# Patient Record
Sex: FEMALE | Race: BLACK OR AFRICAN AMERICAN | Employment: OTHER | URBAN - METROPOLITAN AREA
[De-identification: names, ages, dates, MRNs, and addresses within clinical notes are randomized per-mention and may not be internally consistent; named-entity substitution may affect disease eponyms.]

---

## 2018-07-26 ENCOUNTER — OFFICE VISIT (OUTPATIENT)
Dept: ORTHOPEDIC SURGERY | Age: 63
End: 2018-07-26

## 2018-07-26 VITALS
OXYGEN SATURATION: 96 % | SYSTOLIC BLOOD PRESSURE: 153 MMHG | RESPIRATION RATE: 16 BRPM | HEIGHT: 64 IN | TEMPERATURE: 98.5 F | DIASTOLIC BLOOD PRESSURE: 86 MMHG | BODY MASS INDEX: 47.8 KG/M2 | HEART RATE: 86 BPM | WEIGHT: 280 LBS

## 2018-07-26 DIAGNOSIS — M62.838 MUSCLE SPASM: ICD-10-CM

## 2018-07-26 DIAGNOSIS — M25.561 ACUTE PAIN OF RIGHT KNEE: ICD-10-CM

## 2018-07-26 DIAGNOSIS — M54.50 LUMBAR PAIN: Primary | ICD-10-CM

## 2018-07-26 DIAGNOSIS — R26.9 GAIT ABNORMALITY: ICD-10-CM

## 2018-07-26 DIAGNOSIS — M25.551 RIGHT HIP PAIN: ICD-10-CM

## 2018-07-26 DIAGNOSIS — M25.511 ACUTE PAIN OF RIGHT SHOULDER: ICD-10-CM
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
## 2018-07-26 DIAGNOSIS — E66.01 OBESITY, MORBID (HCC): ICD-10-CM

## 2018-07-26 RX ORDER — TIZANIDINE 4 MG/1
4 TABLET ORAL
Qty: 60 TAB | Refills: 1 | Status: SHIPPED | OUTPATIENT
Start: 2018-07-26 | End: 2019-04-15 | Stop reason: ALTCHOICE

## 2018-07-26 RX ORDER — DICLOFENAC SODIUM 75 MG/1
75 TABLET, DELAYED RELEASE ORAL 2 TIMES DAILY WITH MEALS
Qty: 60 TAB | Refills: 1 | Status: SHIPPED | OUTPATIENT
Start: 2018-07-26 | End: 2019-04-15 | Stop reason: ALTCHOICE

## 2018-07-26 RX ORDER — KETOROLAC TROMETHAMINE 15 MG/ML
60 INJECTION, SOLUTION INTRAMUSCULAR; INTRAVENOUS ONCE
Qty: 1 VIAL | Refills: 0
Start: 2018-07-26 | End: 2018-07-26

## 2018-07-26 NOTE — PATIENT INSTRUCTIONS

## 2018-07-26 NOTE — PROGRESS NOTES
Frank Nicholson [facility] 2. 
[address] Phone: [phone] Fax: [phone] NEW PATIENT Pt's YOB: 1955 ASSESSMENT Diagnoses and all orders for this visit: 1. Lumbar pain -     [25490] LS Spine 4V -     KETOROLAC TROMETHAMINE INJ 
-     ketorolac (TORADOL) 15 mg/mL soln injection; 4 mL by IntraMUSCular route once for 1 dose. -     THER/PROPH/DIAG INJECTION, SUBCUT/IM 
-     diclofenac EC (VOLTAREN) 75 mg EC tablet; Take 1 Tab by mouth two (2) times daily (with meals). -     REFERRAL TO ORTHOPEDICS 2. Fall, initial encounter 
-     diclofenac EC (VOLTAREN) 75 mg EC tablet; Take 1 Tab by mouth two (2) times daily (with meals). -     REFERRAL TO ORTHOPEDICS 3. Right hip pain 
-     diclofenac EC (VOLTAREN) 75 mg EC tablet; Take 1 Tab by mouth two (2) times daily (with meals). -     REFERRAL TO ORTHOPEDICS 4. Acute pain of right knee 
-     diclofenac EC (VOLTAREN) 75 mg EC tablet; Take 1 Tab by mouth two (2) times daily (with meals). -     REFERRAL TO ORTHOPEDICS 5. Acute pain of right shoulder 
-     REFERRAL TO ORTHOPEDICS 6. Gait abnormality 
-     diclofenac EC (VOLTAREN) 75 mg EC tablet; Take 1 Tab by mouth two (2) times daily (with meals). -     REFERRAL TO ORTHOPEDICS 7. Muscle spasm -     tiZANidine (ZANAFLEX) 4 mg tablet; Take 1 Tab by mouth three (3) times daily as needed. Indications: Muscle Spasm 8. [facility], [facility] IMPRESSION AND PLAN: 
Nawaf Marquez is a 57 y.o.  female with history of a severe fall and subsequent injury to her hip, shoulder and back. Her foot became stuck in a 1 foot deep hole (a post hole per patient) on 7/18/18 while at the [facility]. She has experienced right hip, back  and right shoulder pain since the incident and has tried ibuprofen with minimal relief. 1) Pt was given information on lumbar exercises.   
2)  Toradol injection in the office today. 3) Pt was given prescription for Zanaflex 4 mg 1 tab TID prn muscle spasm. 4) She was also prescribed Voltaren 75 mg 1 tab BID prn pain with food. 5) Pt was offered referral to physical therapy but deferred at this time. 6) Referral to Dr. Charu Carlson for right hip, knee, and shoulder evaluation. 7) Ms. Kaia Woodruff has a reminder for a \"due or due soon\" health maintenance. I have asked that she contact her primary care provider, Corey German MD, for follow-up on this health maintenance. 8)  demonstrated consistency with prescribing. 9) Pt will follow-up in 6 weeks or sooner if needed. HISTORY OF PRESENT ILLNESS: 
Jose Roberto Garland is a 57 y.o.  female with history of right hip pain. She presents to the office today as a new patient. Pt reports that she fell in a 1 foot deep hole while leaving the [facility] with her daughter on 7/18/18. She states that her foot got stuck in a hole in the grass and she fell. Pt states she felt a popping in her R hip when she fell. She could not get up with the help of others so the ambulance was called. She was taken to [facility] ER where they did x-rays of her hip and the pt was told there was no abnormal findings. Pt states that when she rises from sitting, she experiences a burning pain in her right thigh and groin. She denies experiencing any left sided symptoms at this time. She states that she does not have trouble walking but intermittently pain will suddenly shoot down her right leg and causes her to sit down. The pain starts in her right buttock and radiates down her right leg. Pt only gets relief from lying down and her pain is worse when sitting, standing, walking lifting, and bending. She also has pain in her right shoulder and states she landed with her arms stretched out forward Pt was given prescription for ibuprofen 600 mg without relief. Pt at this time desires to proceed with medication evaluation.  
 
Of note, her  is from [place]. Pain Scale: 8/10 PCP: Roseanne Hoang MD 
 
Past Medical History:  
Diagnosis Date  Hyperlipidemia  Hypertension Social History Social History  Marital status: UNKNOWN Spouse name: N/A  
 Number of children: N/A  
 Years of education: N/A Occupational History  Not on file. Social History Main Topics  Smoking status: Never Smoker  Smokeless tobacco: Never Used  Alcohol use No  
 Drug use: No  
 Sexual activity: Not on file Other Topics Concern  Not on file Social History Narrative Current Outpatient Prescriptions Medication Sig Dispense Refill  diclofenac EC (VOLTAREN) 75 mg EC tablet Take 1 Tab by mouth two (2) times daily (with meals). 60 Tab 1  
 tiZANidine (ZANAFLEX) 4 mg tablet Take 1 Tab by mouth three (3) times daily as needed. Indications: Muscle Spasm 60 Tab 1  
 hydroCHLOROthiazide (HYDRODIURIL) 25 mg tablet Take 25 mg by mouth daily.  atorvastatin (LIPITOR) 40 mg tablet Take 40 mg by mouth daily.  ibuprofen (MOTRIN) 600 mg tablet Take 1 Tab by mouth every six (6) hours as needed for Pain. 20 Tab 0 No Known Allergies REVIEW OF SYSTEMS Constitutional: Negative for fever, chills, or weight change. Respiratory: Negative for cough or shortness of breath. Cardiovascular: Negative for chest pain or palpitations. Gastrointestinal: Negative for acid reflux, change in bowel habits, or constipation. Genitourinary: Negative for dysuria and flank pain. Musculoskeletal: Positive for right hip, shoulder and lumbar pain. Skin: Negative for rash. Neurological: Negative for headaches, dizziness, or numbness. Endo/Heme/Allergies: Negative for increased bruising. Psychiatric/Behavioral: Negative for difficulty with sleep. PHYSICAL EXAMINATION Visit Vitals  /86 (BP 1 Location: Left arm, BP Patient Position: Sitting)  Pulse 86  Temp 98.5 °F (36.9 °C) (Oral)  Resp 16  Ht 5' 4\" (1.626 m)  Wt 280 lb (127 kg)  SpO2 96%  BMI 48.06 kg/m2 Constitutional: Awake, alert, with mild-moderate discomfort. She is sitting on her left hip with her right leg stretched out. HEENT: Normocephalic. Atraumatic. Oropharynx is moist and clear. PERRL. EOMI. Sclerae are nonicteric Heart: Regular rate and rhythm Lungs: Clear to auscultation bilaterally Abdomen: Soft and nontender. Bowel sounds are present Neurological: 1+ symmetrical DTRs in the upper extremities. 1+ symmetrical DTRs in the lower extremities. Sensation to light touch is intact. Negative Mike's sign bilaterally. Skin: warm, dry, and intact. Musculoskeletal: Very tight across the upper trapezius bilaterally. Positive Alicia' sign and Neer's sign on the right. Pain with extension, axial loading, or forward flexion. Severe pain with internal or external rotation of her right hip. Positive straight leg raise on the right. Biceps  Triceps Deltoids Wrist Ext Wrist Flex Hand Intrin Right +4/5 +4/5 +4/5 +4/5 +4/5 +4/5 Left +4/5 +4/5 +4/5 +4/5 +4/5 +4/5 Hip Flex  Quads Hamstrings Ankle DF EHL Ankle PF Right +3/5 -4/5 -4/5  4/5  4/5  4/5 Left +4/5 +4/5 +4/5 +4/5 +4/5 +4/5 IMAGING: 
 
Lumbar X-ray on 07/26/18 was personally reviewed with the patient and demonstrated: 1. Very mild dextroscoliosis 2. Multilevel degenerative discs and facets 3. No instability 4. Large body habitus Right Knee X-ray on 07/18/18 was personally reviewed with the patient and demonstrated: 
 
Results from [place] encounter on 07/18/18 XR KNEE RT MIN 4 V Narrative Right knee radiograph 4 views INDICATION: Pain after fall COMPARISON: None Impression IMPRESSION: 
Tricompartmental joint space narrowing is seen most notable in the lateral and 
patellofemoral compartments. Osteochondromatosis noted. Age indeterminate 
fracture / avulsion of the superior patellar enthesophyte.  Additional areas of 
enthesopathy versus exostosis noted involving the proximal fibula and tibia. Right Hip X-ray on 07/18/18 was personally reviewed with the patient and demonstrated:  
 
Study Result Right hip radiograph 2 views including frontal pelvis 
  INDICATION: Pain after fall 
  
COMPARISON: None 
  
IMPRESSION IMPRESSION: 
There is multifocal enthesopathy. No acute fracture or dislocation seen. 
   
 
 
Written by Yanni Ventura, as dictated by Zeyad Dill MD. 
I, Dr. Zeyad Dill confirm that all documentation is accurate.

## 2018-07-26 NOTE — MR AVS SNAPSHOT
[address] [facility] 
 
 
 [address] [facility] 
[phone] Patient: Alicia Spicer MRN: [id number] :1955 Visit Information Date & Time Provider Department Dept. Phone Encounter #  
 2018  3:00 PM Felicia Haque MD [facility] [phone] [id number] Follow-up Instructions Return in about 6 weeks (around 2018) for with me and pt to see Dr. Farideh agosto. Upcoming Health Maintenance Date Due Hepatitis C Screening 1955 DTaP/Tdap/Td series (1 - Tdap) 10/21/1974 PAP AKA CERVICAL CYTOLOGY 10/21/1974 FOBT Q 1 YEAR AGE 50-75 10/21/2003 BREAST CANCER SCRN MAMMOGRAM 2012 ZOSTER VACCINE AGE 60> 2013 Bone Densitometry (Dexa) Screening 10/21/2018 Influenza Age 5 to Adult 2018 Allergies as of 2018  Review Complete On: 2018 By: Zheng Key LPN No Known Allergies Current Immunizations  Never Reviewed No immunizations on file. Not reviewed this visit You Were Diagnosed With   
  
 Codes Comments Lumbar pain    -  Primary ICD-10-CM: M54.5 ICD-9-CM: 724.2 Fall, initial encounter     ICD-10-CM: W19. Clifforddenisse Jasso ICD-9-CM: E888.9 Right hip pain     ICD-10-CM: M25.551 ICD-9-CM: 719.45 Acute pain of right knee     ICD-10-CM: M25.561 ICD-9-CM: 719.46 Gait abnormality     ICD-10-CM: R26.9 ICD-9-CM: 781.2 Muscle spasm     ICD-10-CM: X66.864 ICD-9-CM: 728.85 Vitals BP Pulse Temp Resp Height(growth percentile) Weight(growth percentile) 153/86 (BP 1 Location: Left arm, BP Patient Position: Sitting) 86 98.5 °F (36.9 °C) (Oral) 16 5' 4\" (1.626 m) 280 lb (127 kg) SpO2 BMI OB Status Smoking Status 96% 48.06 kg/m2 Postmenopausal Never Smoker BMI and BSA Data Body Mass Index Body Surface Area 48.06 kg/m 2 2.39 m 2 Your Updated Medication List  
  
   
 This list is accurate as of 7/26/18  4:52 PM.  Always use your most recent med list.  
  
  
  
  
 diclofenac EC 75 mg EC tablet Commonly known as:  VOLTAREN Take 1 Tab by mouth two (2) times daily (with meals). hydroCHLOROthiazide 25 mg tablet Commonly known as:  HYDRODIURIL Take 25 mg by mouth daily. ibuprofen 600 mg tablet Commonly known as:  MOTRIN Take 1 Tab by mouth every six (6) hours as needed for Pain.  
  
 ketorolac 15 mg/mL Soln injection Commonly known as:  TORADOL  
4 mL by IntraMUSCular route once for 1 dose. LIPITOR 40 mg tablet Generic drug:  atorvastatin Take 40 mg by mouth daily. tiZANidine 4 mg tablet Commonly known as:  Joe Patel Take 1 Tab by mouth three (3) times daily as needed. Indications: Muscle Spasm Prescriptions Printed Refills  
 diclofenac EC (VOLTAREN) 75 mg EC tablet 1 Sig: Take 1 Tab by mouth two (2) times daily (with meals). Class: Print Route: Oral  
 tiZANidine (ZANAFLEX) 4 mg tablet 1 Sig: Take 1 Tab by mouth three (3) times daily as needed. Indications: Muscle Spasm Class: Print Route: Oral  
  
We Performed the Following AMB POC XRAY, SPINE, LUMBOSACRAL; 4+ [id number] CPT(R)] KETOROLAC TROMETHAMINE INJ [ [facility]] MT THER/PROPH/DIAG INJECTION, SUBCUT/IM [id number] CPT(R)] REFERRAL TO ORTHOPEDICS [FIX568 Custom] Comments:  
 Evaluation of right hip, right shoulder and right knee. Follow-up Instructions Return in about 6 weeks (around 9/6/2018) for with me and pt to see Dr. Christopher agosto. Referral Information Referral ID Referred By Referred To  
  
 [id number] Lexi Gomez MD   
   [facility] [address] [place], [address] Phone: [phone] Fax: [phone] Visits Status Start Date End Date 1 New Request 7/26/18 7/26/19  If your referral has a status of pending review or denied, additional information will be sent to support the outcome of this decision. Patient Instructions Low Back Pain: Exercises Your Care Instructions Here are some examples of typical rehabilitation exercises for your condition. Start each exercise slowly. Ease off the exercise if you start to have pain. Your doctor or physical therapist will tell you when you can start these exercises and which ones will work best for you. How to do the exercises Press-up 1. Lie on your stomach, supporting your body with your forearms. 2. Press your elbows down into the floor to raise your upper back. As you do this, relax your stomach muscles and allow your back to arch without using your back muscles. As your press up, do not let your hips or pelvis come off the floor. 3. Hold for 15 to 30 seconds, then relax. 4. Repeat 2 to 4 times. Alternate arm and leg (bird dog) exercise Do this exercise slowly. Try to keep your body straight at all times, and do not let one hip drop lower than the other. 1. Start on the floor, on your hands and knees. 2. Tighten your belly muscles. 3. Raise one leg off the floor, and hold it straight out behind you. Be careful not to let your hip drop down, because that will twist your trunk. 4. Hold for about 6 seconds, then lower your leg and switch to the other leg. 5. Repeat 8 to 12 times on each leg. 6. Over time, work up to holding for 10 to 30 seconds each time. 7. If you feel stable and secure with your leg raised, try raising the opposite arm straight out in front of you at the same time. Knee-to-chest exercise 1. Lie on your back with your knees bent and your feet flat on the floor. 2. Bring one knee to your chest, keeping the other foot flat on the floor (or keeping the other leg straight, whichever feels better on your lower back). 3. Keep your lower back pressed to the floor. Hold for at least 15 to 30 seconds. 4. Relax, and lower the knee to the starting position. 5. Repeat with the other leg. Repeat 2 to 4 times with each leg. 6. To get more stretch, put your other leg flat on the floor while pulling your knee to your chest. 
Curl-ups 1. Lie on the floor on your back with your knees bent at a 90-degree angle. Your feet should be flat on the floor, about 12 inches from your buttocks. 2. Cross your arms over your chest. If this bothers your neck, try putting your hands behind your neck (not your head), with your elbows spread apart. 3. Slowly tighten your belly muscles and raise your shoulder blades off the floor. 4. Keep your head in line with your body, and do not press your chin to your chest. 
5. Hold this position for 1 or 2 seconds, then slowly lower yourself back down to the floor. 6. Repeat 8 to 12 times. Pelvic tilt exercise 1. Lie on your back with your knees bent. 2. \"Brace\" your stomach. This means to tighten your muscles by pulling in and imagining your belly button moving toward your spine. You should feel like your back is pressing to the floor and your hips and pelvis are rocking back. 3. Hold for about 6 seconds while you breathe smoothly. 4. Repeat 8 to 12 times. Heel dig bridging 1. Lie on your back with both knees bent and your ankles bent so that only your heels are digging into the floor. Your knees should be bent about 90 degrees. 2. Then push your heels into the floor, squeeze your buttocks, and lift your hips off the floor until your shoulders, hips, and knees are all in a straight line. 3. Hold for about 6 seconds as you continue to breathe normally, and then slowly lower your hips back down to the floor and rest for up to 10 seconds. 4. Do 8 to 12 repetitions. Hamstring stretch in doorway 1. Lie on your back in a doorway, with one leg through the open door. 2. Slide your leg up the wall to straighten your knee. You should feel a gentle stretch down the back of your leg. 3. Hold the stretch for at least 15 to 30 seconds. Do not arch your back, point your toes, or bend either knee. Keep one heel touching the floor and the other heel touching the wall. 4. Repeat with your other leg. 5. Do 2 to 4 times for each leg. Hip flexor stretch 1. Kneel on the floor with one knee bent and one leg behind you. Place your forward knee over your foot. Keep your other knee touching the floor. 2. Slowly push your hips forward until you feel a stretch in the upper thigh of your rear leg. 3. Hold the stretch for at least 15 to 30 seconds. Repeat with your other leg. 4. Do 2 to 4 times on each side. Wall sit 1. Stand with your back 10 to 12 inches away from a wall. 2. Lean into the wall until your back is flat against it. 3. Slowly slide down until your knees are slightly bent, pressing your lower back into the wall. 4. Hold for about 6 seconds, then slide back up the wall. 5. Repeat 8 to 12 times. Follow-up care is a key part of your treatment and safety. Be sure to make and go to all appointments, and call your doctor if you are having problems. It's also a good idea to know your test results and keep a list of the medicines you take. Where can you learn more? Go to http://yas-alverto.info/. Enter Y209 in the search box to learn more about \"Low Back Pain: Exercises. \" Current as of: November 29, 2017 Content Version: 11.7 © 1060-3069 [employer]. Care instructions adapted under license by [employer] (which disclaims liability or warranty for this information). If you have questions about a medical condition or this instruction, always ask your healthcare professional. [address] any warranty or liability for your use of this information. Introducing [facility]!    
 Moustapha Gutierrez introduces [employer] patient portal. Now you can access parts of your medical record, email your doctor's office, and request medication refills online. 1. In your internet browser, go to [email or web address] 2. Click on the First Time User? Click Here link in the Sign In box. You will see the New Member Sign Up page. 3. Enter your [employer] Access Code exactly as it appears below. You will not need to use this code after youve completed the sign-up process. If you do not sign up before the expiration date, you must request a new code. · [employer] Access Code: [email or web address] Expires: 10/16/2018 12:21 PM 
 
4. Enter the last four digits of your Social Security Number (xxxx) and Date of Birth (mm/dd/yyyy) as indicated and click Submit. You will be taken to the next sign-up page. 5. Create a [employer] ID. This will be your [employer] login ID and cannot be changed, so think of one that is secure and easy to remember. 6. Create a [employer] password. You can change your password at any time. 7. Enter your Password Reset Question and Answer. This can be used at a later time if you forget your password. 8. Enter your e-mail address. You will receive e-mail notification when new information is available in [address] 9. Click Sign Up. You can now view and download portions of your medical record. 10. Click the Download Summary menu link to download a portable copy of your medical information. If you have questions, please visit the Frequently Asked Questions section of the [employer] website. Remember, [employer] is NOT to be used for urgent needs. For medical emergencies, dial 911. Now available from your iPhone and Android! Please provide this summary of care documentation to your next provider. Your primary care clinician is listed as Del Angel [place]. If you have any questions after today's visit, please call [phone].

## 2018-08-07 ENCOUNTER — OFFICE VISIT (OUTPATIENT)
Dept: ORTHOPEDIC SURGERY | Age: 63
End: 2018-08-07

## 2018-08-07 VITALS
WEIGHT: 278 LBS | OXYGEN SATURATION: 97 % | BODY MASS INDEX: 47.46 KG/M2 | DIASTOLIC BLOOD PRESSURE: 85 MMHG | HEIGHT: 64 IN | SYSTOLIC BLOOD PRESSURE: 133 MMHG | HEART RATE: 101 BPM

## 2018-08-07 DIAGNOSIS — S76.011A HIP STRAIN, RIGHT, INITIAL ENCOUNTER: ICD-10-CM

## 2018-08-07 DIAGNOSIS — M51.16 LUMBAR DISC DISEASE WITH RADICULOPATHY: ICD-10-CM

## 2018-08-07 DIAGNOSIS — M25.551 PAIN OF RIGHT HIP JOINT: Primary | ICD-10-CM

## 2018-08-07 NOTE — PROGRESS NOTES
HISTORY OF PRESENT ILLNESS:  Pilo Moreno is a 77-year-old female who is referred by Dr. Nicci Negro for consultation regarding right hip pain.   On further questioning, she has pain along the posterolateral aspect of the right hip and buttock that radiates down the right leg. She notes numbness in the lateral aspect of the right thigh. The pain occasionally radiates below her right knee. This occurred after an injury several weeks ago. She really did not have a lot of pain before that. She went to the emergency room, and they placed her on some Ibuprofen. She then saw Dr. Nicci Negro, who gave her some medication that has helped her a little bit more with her discomfort. Her pain is present when she is sitting and walking, as well as when she is trying to sleep. She does notice increased pain with weightbearing. She notes occasional pain in the groin. She does not notice leg length discrepancy. She does not utilize and ambulatory assist.     She does have a history of about a 100-pound weight gain over the past nine months. She states she was diagnosed with a thyroid problem. PHYSICAL EXAMINATION:   Clinical examination today reveals a significantly overweight, 77-year-old,  female in mild discomfort. She moves relatively easily on and off the examination table. She does not ambulate with an antalgic gait. She does not utilize an ambulatory assist.  With reference to her left hip, she is able to straight leg raise to 90° today without significant discomfort. This does result in slight pain in the right posterior buttock. She has good passive rotation of the left hip without pain. Left hip roll test is negative. Vicente's test on the left side is negative. With reference to her right hip, she is able to straight leg raise about 60° with help, but this does result in some radicular pain in the right lower extremity.   She has good passive rotation of the right hip without distraction. Vicente's test on the right side results in pain in the posterolateral aspect of the right hip and right thigh. Leg lengths are symmetrical in the supine position. Right hip roll test is negative. Neurovascular testing is intact to the lower extremities proximal and distal to motor strength and sensation. RADIOGRAPHS:  X-rays of her pelvis today reveal her hip joints appear symmetrical and essentially within normal limits. She still has very good joint space in the weightbearing portion of both hips. She has enthesopathy of both iliac crests, and this is old and chronic. Her pelvic x-ray does reveal significant degenerative disc disease at the L4-5 and L5-S1 level. These are not complete back x-rays. Dr. Evelina Mobley has done x-rays of her lumbar spine before. IMPRESSION:      1. 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 2. Right hip strain. RECOMMENDATIONS:  I discussed with the patient I do not feel her symptoms are related to her right hip. Her hip examination and radiographs are essentially negative. I think her symptoms are neurogenic in origin. She will continue with Dr. Evelina Mobley. She just started the new medication about a week and a half ago. So, she needs to give this a little bit of time to work. She will followup with Dr. Evelina Mobley in about three to four weeks. I did discuss with the patient weight loss. She is contemplating proceeding with gastric bypass surgery next spring. In addition, she is working on getting her thyroid problem evaluated. The patient understands, and all of her question were answered. She will followup with Dr. Evelina Mobley.                     Vitals:    08/07/18 1333   BP: 133/85   Pulse: (!) 101   SpO2: 97%   Weight: 278 lb (126.1 kg)   Height: 5' 4\" (1.626 m)       Patient Active Problem List   Diagnosis Code    Obesity, morbid ([facility] [address] E66.01     Patient Active Problem List    Diagnosis Date Noted    Obesity, morbid ([facility] [address] 07/26/2018     Current Outpatient Prescriptions   Medication Sig Dispense Refill    diclofenac EC (VOLTAREN) 75 mg EC tablet Take 1 Tab by mouth two (2) times daily (with meals). 60 Tab 1    tiZANidine (ZANAFLEX) 4 mg tablet Take 1 Tab by mouth three (3) times daily as needed. Indications: Muscle Spasm 60 Tab 1    hydroCHLOROthiazide (HYDRODIURIL) 25 mg tablet Take 25 mg by mouth daily.  atorvastatin (LIPITOR) 40 mg tablet Take 40 mg by mouth daily.  ibuprofen (MOTRIN) 600 mg tablet Take 1 Tab by mouth every six (6) hours as needed for Pain.  20 Tab 0     No Known Allergies  Past Medical History:   Diagnosis Date    Hyperlipidemia     Hypertension      Past Surgical History:   Procedure Laterality Date    HX HYSTERECTOMY       Family History   Problem Relation Age of Onset    Family history unknown: Yes     Social History   Substance Use Topics    Smoking status: Never Smoker    Smokeless tobacco: Never Used    Alcohol use No

## 2018-08-27 ENCOUNTER — TELEPHONE (OUTPATIENT)
Dept: ORTHOPEDIC SURGERY | Age: 63
End: 2018-08-27

## 2018-08-27 NOTE — TELEPHONE ENCOUNTER
Returned call to patient, verified , per patient, she is trying to set up transit with a Medicaid transport company. Per patient, they are requesting permission for patient to use their services. Per patient they need to verify patient cannot walk for long distances/patient is unable to climb steps due to her lower back pain. Informed patient I would return call once letter has been approved/denied. Patient verbalized agreement/understanding.

## 2018-08-27 NOTE — TELEPHONE ENCOUNTER
Letter provided, called patient, verified , reviewed contents of letter with patient. Patient verbalized agreement/understanding. No further action required at this time.

## 2018-08-27 NOTE — TELEPHONE ENCOUNTER
Patient called and said that she needs a letter from [address] [place] [address] that would state that she is having issues walking. Patient said she needs the letter for transportation, walking and climbing. Will  from [facility]. Patient tel. [phone].

## 2018-08-27 NOTE — LETTER
8/27/2018 3:44 PM 
 
Ms. Carlos Manuel Nielsen [address] To Whom It May Concern: 
 
Juan Carlos Sawyer is currently under the care of [facility]. Ms. Theo Barber has difficulty with walking, sitting, and climbing due to her back pain. If there are questions or concerns please have the patient contact our office. Sincerely, STANLEY Miller

## 2018-08-27 NOTE — TELEPHONE ENCOUNTER
Call pt and clarify what she needs. I don't understand why she needs this \"Patient said she needs the letter for transportation, walking and climbing. \"   The record does not document her needing an assistive device for walking.

## 2019-04-15 ENCOUNTER — OFFICE VISIT (OUTPATIENT)
Dept: SURGERY | Age: 64
End: 2019-04-15

## 2019-04-15 VITALS
HEART RATE: 87 BPM | TEMPERATURE: 98.2 F | WEIGHT: 280.7 LBS | BODY MASS INDEX: 47.92 KG/M2 | DIASTOLIC BLOOD PRESSURE: 77 MMHG | OXYGEN SATURATION: 97 % | SYSTOLIC BLOOD PRESSURE: 147 MMHG | HEIGHT: 64 IN

## 2019-04-15 DIAGNOSIS — I10 HYPERTENSION, UNSPECIFIED TYPE: ICD-10-CM

## 2019-04-15 DIAGNOSIS — E66.01 MORBID OBESITY WITH BMI OF 45.0-49.9, ADULT (HCC): ICD-10-CM

## 2019-04-15 DIAGNOSIS — E11.9 TYPE 2 DIABETES MELLITUS WITHOUT COMPLICATION, WITHOUT LONG-TERM CURRENT USE OF INSULIN (HCC): ICD-10-CM

## 2019-04-15 DIAGNOSIS — E78.5 HYPERLIPIDEMIA, UNSPECIFIED HYPERLIPIDEMIA TYPE: ICD-10-CM

## 2019-04-15 DIAGNOSIS — J45.909 ASTHMA, UNSPECIFIED ASTHMA SEVERITY, UNSPECIFIED WHETHER COMPLICATED, UNSPECIFIED WHETHER PERSISTENT: ICD-10-CM

## 2019-04-15 DIAGNOSIS — E03.9 HYPOTHYROIDISM, UNSPECIFIED TYPE: ICD-10-CM

## 2019-04-15 DIAGNOSIS — E66.01 OBESITY, MORBID (HCC): Primary | ICD-10-CM

## 2019-04-15 DIAGNOSIS — K21.9 GASTROESOPHAGEAL REFLUX DISEASE WITHOUT ESOPHAGITIS: ICD-10-CM

## 2019-04-15 RX ORDER — PANTOPRAZOLE SODIUM 40 MG/1
TABLET, DELAYED RELEASE ORAL
Refills: 0 | COMMUNITY
Start: 2019-01-18 | End: 2019-10-21

## 2019-04-15 RX ORDER — LEVOTHYROXINE SODIUM 75 UG/1
TABLET ORAL
COMMUNITY
End: 2019-11-14

## 2019-04-15 RX ORDER — METFORMIN HYDROCHLORIDE 1000 MG/1
1000 TABLET ORAL 2 TIMES DAILY WITH MEALS
COMMUNITY
End: 2019-10-21 | Stop reason: ALTCHOICE

## 2019-04-15 RX ORDER — ASPIRIN 81 MG/1
81 TABLET ORAL
COMMUNITY
End: 2019-10-30

## 2019-04-15 RX ORDER — CETIRIZINE HCL 10 MG
10 TABLET ORAL
Refills: 6 | COMMUNITY
Start: 2019-03-27

## 2019-04-15 RX ORDER — ALBUTEROL SULFATE 90 UG/1
AEROSOL, METERED RESPIRATORY (INHALATION)
Refills: 1 | COMMUNITY
Start: 2019-02-24

## 2019-04-15 NOTE — PROGRESS NOTES
Bariatric Surgery Consultation Subjective: The patient is a 70 y.o. obese female with a Body mass index is 48.18 kg/m². .  The patient is at her heaviest weight for the past several years. she has been overweight since being diagnosed with pituitary tumor 5 years ago. she has been considering surgery since past 2 years. she desires surgery at this time because of multiple health concerns and their lifestyle issues which are hindered by their weight. she has been referred by his family physician Dr Nyla Goetz for evaluation and treatment of their obesity via surgical intervention. Pacheco Hernadez has tried multiple diets in her lifetime most recently tried behavior modification and unsupervised diets Bariatric comorbidities present are Patient Active Problem List  
Diagnosis Code  Obesity, morbid ([facility] [address] E66.01  
 Hypertension I10  
 Hyperlipidemia E78.5  Asthma J45.909  Diabetes ([facility] [address] E11.9  GERD (gastroesophageal reflux disease) K21.9  Morbid obesity with BMI of 45.0-49.9, adult (HCC) E66.01, Z68.42  
 Hypothyroid E03.9  Low back pain M54.5  Arthritis of knee M17.10 The patient is considering laparoscopic gastric bypass surgery for surgical weight loss due to their ineffective progress with medical forms of weight loss and the urging of their physician who cares for their primary medical issues. The patient  now presents  for consideration for weight loss surgery understanding the benefits of this over a medical approach of weight loss as was discussed in our presentation on weight loss surgery. They have discussed their plans both with their family and primary care physician who is in support of their pursuit of such. The patient has not had health issues as of late and denies and gastrointestinal disturbances other than what is outlined below in their review of symptoms.  All of their prior evaluations available by both their PCP's and specialists physicians have been reviewed today either in the Care Everywhere portal or scanned under the media tab. I have spent a large portion of my initial consultation today reviewing the patients current dietary habits which have contributed to their health issues and obesity. I have suggested to them personally a dietary regimen that they can initiate now to help with their status as it pertains to their weight. They understand that the most important aspect of their journey through their weight loss endeavor will be their adherence to a new lifestyle of healthy eating behavior. They also understand that an adherence to an exercise program will not only help with weight loss but is ultimately important in weight maintenance. The patients goal weight is 165 lb. These goals are consistent with expected outcomes of their desired operation. her Medical goals are resolution of these health issues. Patient Active Problem List  
 Diagnosis Date Noted  Morbid obesity with BMI of 45.0-49.9, adult ([facility] [address] 04/15/2019  Hypertension  Hyperlipidemia  Asthma  Diabetes ([facility] [address]  GERD (gastroesophageal reflux disease)  Hypothyroid  Low back pain  Arthritis of knee  Obesity, morbid ([facility] [address] 07/26/2018 Past Surgical History:  
Procedure Laterality Date  ABDOMEN SURGERY PROC UNLISTED    
 abdominoplasty  HX HYSTERECTOMY  HX OTHER SURGICAL  01/28/2019 Pituitary tumor resection, Dr. Husain Oar  HX TONSILLECTOMY Social History Tobacco Use  Smoking status: Never Smoker  Smokeless tobacco: Never Used Substance Use Topics  Alcohol use: No  
  
Family History Problem Relation Age of Onset  Pancreatic Cancer Mother  Diabetes Father  Thyroid Disease Maternal Grandmother  Other Maternal Grandmother   
     pituitary tumor Current Outpatient Medications Medication Sig Dispense Refill  ibuprofen (ADVIL) 100 mg tablet Take 100 mg by mouth every six (6) hours as needed for Pain.  levothyroxine (SYNTHROID) 75 mcg tablet Take  by mouth Daily (before breakfast).  metFORMIN (GLUCOPHAGE) 1,000 mg tablet Take 1,000 mg by mouth two (2) times daily (with meals).  hydroCHLOROthiazide (HYDRODIURIL) 25 mg tablet Take 25 mg by mouth daily.  atorvastatin (LIPITOR) 40 mg tablet Take 40 mg by mouth daily.  VENTOLIN HFA 90 mcg/actuation inhaler INHALE 2 PUFFS BY MOUTH EVERY 6 HOURS  1  
 aspirin delayed-release 81 mg tablet Take 81 mg by mouth.  cetirizine (ZYRTEC) 10 mg tablet TAKE 1 BY MOUTH IN THE MORNING  6  
 pantoprazole (PROTONIX) 40 mg tablet TAKE 1 TABLET BY MOUTH DAILY  0 No Known Allergies Review of Systems:  
  
 
 
 
General - No history or complaints of unexpected fever, chills, or weight loss Head/Neck - No history or complaints of headache, diplopia, dysphagia, hearing loss Cardiac - No history or complaints of chest pain, palpitations, murmur, or shortness of breath Pulmonary - No history or complaints of shortness of breath, productive cough, hemoptysis Gastrointestinal - has reflux noted which is controlled with PPI,no  abdominal pain, obstipation/constipation or blood per rectum Genitourinary - No history or complaints of hematuria/dysuria, stress urinary incontinence symptoms, or renal lithiasis Musculoskeletal - has joint pain in their knees and low back,  no muscular weakness Hematologic - No history or complaints of bleeding disorders,  No blood transfusions Neurologic - No history or complaints of  migraine headaches, seizure activity, syncopal episodes, TIA or stroke Integumentary - No history or complaints of rashes, abnormal nevi, skin cancer Gynecological - No abnormal bleeding, has recurrent UTIs from diabetic medications Objective:  
 
Visit Vitals /77 (BP 1 Location: Left arm, BP Patient Position: Sitting) Pulse 87 Temp 98.2 °F (36.8 °C) (Temporal) Ht 5' 4\" (1.626 m) Wt 127.3 kg (280 lb 11.2 oz) SpO2 97% BMI 48.18 kg/m² Physical Examination: General appearance - alert, well appearing, and in no distress and oriented to person, place, and time Mental status - alert, oriented to person, place, and time, normal mood, behavior, speech, dress, motor activity, and thought processes Eyes - pupils equal and reactive, extraocular eye movements intact, sclera anicteric, left eye normal, right eye normal 
Ears - right ear normal, left ear normal 
Nose - normal and patent, no erythema, discharge or polyps Mouth - mucous membranes moist, pharynx normal without lesions Neck - supple, no significant adenopathy Lymphatics - no palpable lymphadenopathy, no hepatosplenomegaly Chest - clear to auscultation, no wheezes, rales or rhonchi, symmetric air entry Heart - normal rate, regular rhythm, normal S1, S2, no murmurs, rubs, clicks or gallops Abdomen - soft, nontender, nondistended, no masses or organomegaly Back exam - full range of motion, no tenderness, palpable spasm or pain on motion Neurological - alert, oriented, normal speech, no focal findings or movement disorder noted Musculoskeletal - no joint tenderness, deformity or swelling Extremities - peripheral pulses normal, no pedal edema, no clubbing or cyanosis Skin - normal coloration and turgor, no rashes, no suspicious skin lesions noted Labs:  
 
No results found for: WBC, WBCLT, HGBPOC, HGB, HGBP, HCTPOC, HCT, PHCT, RBCH, PLT, MCV, HGBEXT, HCTEXT, PLTEXT, HGBEXT, HCTEXT, PLTEXT No results found for: NA, K, CL, CO2, AGAP, GLU, BUN, CREA, BUCR, GFRAA, GFRNA, CA, TBIL, TBILI, GPT, SGOT, AP, TP, ALB, GLOB, AGRAT, ALT No results found for: IRON, FE, TIBC, IBCT, PSAT, FERR No results found for: FOL, RBCF No results found for: Angy Mckeon, Maggie Brady, Mahsa Wyatt, [id number] Assessment: Morbid obesity with associated comorbidity Plan: laparoscopic gastric bypass surgery This is a 70 y.o. female with a BMI of Body mass index is 48.18 kg/m². and the weight-related co-morbidties of hypertension, diabetes and arthritis. Carlos Manuel meets the NIH criteria for bariatric surgery based upon the BMI of Body mass index is 48.18 kg/m². and multiple weight-related co-morbidties. Carlos Manuel has elected laparoscopic gastric bypass as her intervention of choice for treatment of morbid obestiy through surgical means secondary to its uniform results,  profound baseline suppression of hunger and pace at which weight is lost. 
 
In the office today, following Carlos Manuel's history and physical examination, a 30 minute discussion regarding the anatomic alterations for the laparoscopic gastric bypass  was undertaken. The dietary expectations and the patient  dependent factors for success were thoroughly discussed, to include the need for interval follow-up and long-term dietary changes associated with success. The possible short and long term  complications of the gastric bypass were also discussed, to include but not limited to;death, DVT/PE, staple line leak, bleeding, stricture formation, infection,internal hernia  and pouch dilation. Specific weight related outcomes for success were also discussed with an emphasis on careful and close follow-up with the first year and dietary behavior modification over the first years as baseline cyclical hunger returns  The patient expressed an understanding of the above factors, and her questions were answered in their entirety. In addition, the patient attended a 1.5 hour power point seminar regarding obesity, surgical weight loss including, adjustable gastric band, gastric bypass, and sleeve gastrectomy. This discussion contrasted the different surgical techniques, mechanisms of actions and expected outcomes, and surgical and medical risks associated with each procedure.   During this seminar, there was a long question and answer session where each questions was answered until there were no additional questions. Today, the patient had all of her questions answered and desires to proceed with  bariatric surgery initially choosing the gastric bypass as her surgical option. Secondary Diagnoses:  
 
Dietary Intervention  - The patient is currently scheduled to see or has been followed by a bariatric nutritionist for an attempt at preoperative weight loss as has been dictated by their insurance carrier. They will be assessed at various times during their follow up to evaluate their progress depending on the length of time that is required once again by their carrier. I have explained the importance of preoperative weight loss and the benefits regarding lower surgical risk and also assisting the patient in reaching their weight loss goal.  Finally they understand there is a physiologic benefit from the standpoint of hepatic volume reduction and reduction of central visceral adiposity preoperatively. I have reiterated the importance of a low carbohydrate and high protein regimen to achieve their stated goal. I have reviewed their current eating behavior prior to this encounter and explained to them in an exhaustive fashion the appropriate diet that they should adhere to. They have been encouraged to loose weight pre operatively and understand it is our prerogative to cancel surgery or postpone their procedure in the event of significant weight gain. The patients weight loss goal pre operatively is 10 pounds. Adult Onset Diabetes - The patient has herbie given a very low carbohydrate diet preoperatively along with instructions to monitor their blood sugars on a regular daily basis.  When  their surgery is performed  we will be monitoring the patient with sliding scale insulin and accuchecks.  Based on those values we will determine whether the patient needs a reduction of those medications postoperatively or total removal of those medications on discharge.  We will have the patient continue accuchecks postoperatively while at home also and report to me or their family physician for appropriate adjustments as needed.  The patient also understands that in the event of uncontrolled blood sugar preoperatively that we may choose to postpone their surgery. Hypertension - The patient has a clear understanding of how weight loss improves hypertension as a whole, but also they understand that there is a significant genetic component to this disease process. We will monitor the patients blood pressure while in the hospital and the plan would be to continue those medications postoperatively.  If a diuretic is being used we will stop them on discharge to prevent dehydration particularly with the sleeve gastrectomy and the gastric bypass procedures.  They will be instructed to monitor their blood pressure postoperatively while at home and notify their primary care physician in the event of any significantly high or uncharacteristic readings. GERD -The patient understands that weight loss surgery is not a guaranteed cure for reflux disease but does understand the benefits that weight loss can have on reflux disease. They also understand that at the time of surgery the gastroesophageal junction will be evaluated for the presence of a diaphragmatic hernia. Hernias will be corrected always with the gastric band and sleeve gastrectomy procedures, but only on a case by case basis with the gastric bypass. The patient also understands that neither weight loss surgery nor repair of a diaphragmatic hernia repair guarantees the complete cessation of the disease.   
 
Weight Related Arthritis -The patient understands the benefits that weight loss surgery can have on their arthritis but also understands that weight loss is not a guaranteed cure and relief of symptoms is often dependent on the severity of the underlying disease. The patient also understands that traditional pharmaceutical treatments for this diagnosis are usually unavailable to post-operative weight loss patients due to the effects on the gastrointestinal tract. Any changes to the patients medication treatment will ultimately be made the patients PCP with input by our office. Restrictive Airway Disease - We will continue all of their pulmonary medications in the form of oral pills and inhalers in both the perioperative and postoperative period. They understand that their symptoms should improve with weight loss. Any further testing related to this will be turned over to their family physician or pulmonologist. 
 
Signed By: Ulisses Quevedo MD   
 April 15, 2019

## 2019-04-15 NOTE — PATIENT INSTRUCTIONS
New patient Instructions 1. Ensure all pre-operative insurances requirements are complete (ie; dietary visits, psychology consults, primary care documentation, etc) 2. Adhere to pre-operative weight loss / weight maintenance plan discussed in the office today. 3. Contact the office with any questions on pre-operative clearance issues (ie; cardiology work-up, pulmonary work-up, upper GI study, etc). 4. If a barium upper GI study has been ordered for your evaluation, make sure you are on liquids only the morning of the procedure. Walking for Exercise: Care Instructions Your Care Instructions Walking is one of the easiest ways to get the exercise you need for good health. A brisk, 30-minute walk each day can help you feel better and have more energy. It can help you lower your risk of disease. Walking can help you keep your bones strong and your heart healthy. Check with your doctor before you start a walking plan if you have heart problems, other health issues, or you have not been active in a long time. Follow your doctor's instructions for safe levels of exercise. Follow-up care is a key part of your treatment and safety. Be sure to make and go to all appointments, and call your doctor if you are having problems. It's also a good idea to know your test results and keep a list of the medicines you take. How can you care for yourself at home? Getting started · Start slowly and set a short-term goal. For example, walk for 5 or 10 minutes every day. · Bit by bit, increase the amount you walk every day. Try for at least 30 minutes on most days of the week. You also may want to swim, bike, or do other activities. · If finding enough time is a problem, it is fine to be active in blocks of 10 minutes or more throughout your day and week.  
· To get the heart-healthy benefits of walking, you need to walk briskly enough to increase your heart rate and breathing, but not so fast that you cannot talk comfortably. · Wear comfortable shoes that fit well and provide good support for your feet and ankles. Staying with your plan · After you've made walking a habit, set a longer-term goal. You may want to set a goal of walking briskly for longer or walking farther. Experts say to do 2½ hours of moderate activity a week. A faster heartbeat is what defines moderate-level activity. · To stay motivated, walk with friends, coworkers, or pets. · Use a phone daniella or pedometer to track your steps each day. Set a goal to increase your steps. Once you get there, set a higher goal. Aim for 10,000 steps a day. · If the weather keeps you from walking outside, go for walks at the mall with a friend. Local schools and churches may have indoor gyms where you can walk. Fitting a walk into your workday · Park several blocks away from work, or get off the bus a few stops early. · Use the stairs instead of the elevator, at least for a few floors. · Suggest holding meetings with colleagues during a walk inside or outside the building. · Use the restroom that is the farthest from your desk or workstation. · Use your morning and afternoon breaks to take quick 15-minute walks. Staying safe · Know your surroundings. Walk in a well-lighted, safe place. If it is dark, walk with a partner. Wear light-colored clothing. If you can, buy a vest or jacket that reflects light. · Carry a cell phone for emergencies. · Drink plenty of water. Take a water bottle with you when you walk. This is very important if it is hot out. · Be careful not to slip on wet or icy ground. You can buy \"grippers\" for your shoes to help keep you from slipping. · Pay attention to your walking surface. Use sidewalks and paths. · If you have breathing problems like asthma or COPD, ask your doctor when it is safe for you to walk outdoors. Cold, dry air, smog, pollen, or other things in the air could cause breathing problems. Where can you learn more? Go to http://yas-alverto.info/. Enter R159 in the search box to learn more about \"Walking for Exercise: Care Instructions. \" Current as of: December 7, 2017 Content Version: 11.8 © 8753-1246 [employer]. Care instructions adapted under license by [employer] (which disclaims liability or warranty for this information). If you have questions about a medical condition or this instruction, always ask your healthcare professional. [place] [address] any warranty or liability for your use of this information. New patient Instructions 1. Ensure all pre-operative insurances requirements are complete (ie; dietary visits, psychology consults, primary care documentation, etc) 2. Adhere to pre-operative weight loss / weight maintenance plan discussed in the office today. 3. Contact the office with any questions on pre-operative clearance issues (ie; cardiology work-up, pulmonary work-up, upper GI study, etc). 4. If a barium upper GI study has been ordered for your evaluation, make sure you are on liquids only the morning of the procedure.

## 2019-05-06 ENCOUNTER — HOSPITAL ENCOUNTER (OUTPATIENT)
Dept: LAB | Age: 64
Discharge: HOME | End: 2019-05-06
Payer: MEDICARE

## 2019-05-06 ENCOUNTER — HOSPITAL ENCOUNTER (OUTPATIENT)
Dept: PREADMISSION TESTING | Age: 64
Discharge: HOME | End: 2019-05-06
Payer: MEDICARE

## 2019-05-06 ENCOUNTER — CLINICAL SUPPORT (OUTPATIENT)
Dept: SURGERY | Age: 64
End: 2019-05-06

## 2019-05-06 VITALS — HEIGHT: 64 IN | BODY MASS INDEX: 48.14 KG/M2 | WEIGHT: 282 LBS

## 2019-05-06 DIAGNOSIS — E66.01 MORBID OBESITY WITH BMI OF 45.0-49.9, ADULT (HCC): Primary | ICD-10-CM

## 2019-05-06 LAB
ALBUMIN SERPL-MCNC: 3.3 G/DL (ref 3.4–5)
ALBUMIN/GLOB SERPL: 0.9 {RATIO} (ref 0.8–1.7)
ALP SERPL-CCNC: 115 U/L (ref 45–117)
ALT SERPL-CCNC: 19 U/L (ref 13–56)
ANION GAP SERPL CALC-SCNC: 6 MMOL/L (ref 3–18)
AST SERPL-CCNC: 11 U/L (ref 15–37)
BILIRUB SERPL-MCNC: 0.3 MG/DL (ref 0.2–1)
BUN SERPL-MCNC: 16 MG/DL (ref 7–18)
BUN/CREAT SERPL: 20 (ref 12–20)
CALCIUM SERPL-MCNC: 9.4 MG/DL (ref 8.5–10.1)
CHLORIDE SERPL-SCNC: 109 MMOL/L (ref 100–108)
CO2 SERPL-SCNC: 25 MMOL/L (ref 21–32)
CREAT SERPL-MCNC: 0.82 MG/DL (ref 0.6–1.3)
ERYTHROCYTE [DISTWIDTH] IN BLOOD BY AUTOMATED COUNT: 15 % (ref 11.6–14.5)
EST. AVERAGE GLUCOSE BLD GHB EST-MCNC: 131 MG/DL
GLOBULIN SER CALC-MCNC: 3.5 G/DL (ref 2–4)
GLUCOSE SERPL-MCNC: 97 MG/DL (ref 74–99)
HBA1C MFR BLD: 6.2 % (ref 4.2–5.6)
HCT VFR BLD AUTO: 38.3 % (ref 35–45)
HGB BLD-MCNC: 12.1 G/DL (ref 12–16)
MCH RBC QN AUTO: 25.6 PG (ref 24–34)
MCHC RBC AUTO-ENTMCNC: 31.6 G/DL (ref 31–37)
MCV RBC AUTO: 81.1 FL (ref 74–97)
PLATELET # BLD AUTO: 348 K/UL (ref 135–420)
PMV BLD AUTO: 10.5 FL (ref 9.2–11.8)
POTASSIUM SERPL-SCNC: 4 MMOL/L (ref 3.5–5.5)
PROT SERPL-MCNC: 6.8 G/DL (ref 6.4–8.2)
RBC # BLD AUTO: 4.72 M/UL (ref 4.2–5.3)
SODIUM SERPL-SCNC: 140 MMOL/L (ref 136–145)
WBC # BLD AUTO: 5.3 K/UL (ref 4.6–13.2)

## 2019-05-06 PROCEDURE — 93005 ELECTROCARDIOGRAM TRACING: CPT

## 2019-05-06 PROCEDURE — 36415 COLL VENOUS BLD VENIPUNCTURE: CPT

## 2019-05-06 PROCEDURE — 84443 ASSAY THYROID STIM HORMONE: CPT

## 2019-05-06 PROCEDURE — 82670 ASSAY OF TOTAL ESTRADIOL: CPT

## 2019-05-06 PROCEDURE — 80053 COMPREHEN METABOLIC PANEL: CPT

## 2019-05-06 PROCEDURE — 85027 COMPLETE CBC AUTOMATED: CPT

## 2019-05-06 PROCEDURE — 83001 ASSAY OF GONADOTROPIN (FSH): CPT

## 2019-05-06 PROCEDURE — 84305 ASSAY OF SOMATOMEDIN: CPT

## 2019-05-06 PROCEDURE — 86677 HELICOBACTER PYLORI ANTIBODY: CPT

## 2019-05-06 PROCEDURE — 83036 HEMOGLOBIN GLYCOSYLATED A1C: CPT

## 2019-05-06 NOTE — PROGRESS NOTES
Medical Weight Loss Multi-Disciplinary Program    Name: Jasen Brown   : 1955    Session# 1  Date: 2019     Height: 5' 4\" (162.6 cm)    Weight: 127.9 kg (282 lb) lbs. Body mass index is 48.41 kg/m². Pounds Lost: 0 Pounds Gained: 2 lbs    Physician weight loss goal of 10 lbs, start weight 280 lbs. Behavior Modification    Positive attitude  Comments: Pt is working on the following goals:    Dietitian: Minnie Valenzuela is a 70 y.o. female who present for a pre-op evaluation. Visit Vitals  Ht 5' 4\" (1.626 m)   Wt 127.9 kg (282 lb)   BMI 48.41 kg/m²     Past Medical History:   Diagnosis Date    Arthritis of knee     Asthma     Diabetes ([address]     GERD (gastroesophageal reflux disease)     Hyperlipidemia     Hypertension     Hypothyroid     Low back pain            Procedure:  laparoscopic gastric bypass surgery     Reasons for Surgery:  BMI > 40 with one or more medically significant comorbidities and HTN    Summary:  Pt given brief pre/post-op diet ed and diet hx reviewed. Pt instructed to follow a low calorie, low carbohydrate, high protein diet of about 6830-6326 calories daily. Pt set several goals. See below. What have you done in the past to try to lose weight? Exercise programs, calorie controlled diet, Atkins diet, [employer]     Why didn't you lose weight or keep the weight off?: Patient notes that due to changes in activity she has found weight loss more difficult over the past several years. She had brain surgery last year and notes since that time her weight loss has been more diffcult    Why do you think having weight loss surgery will make it possible for you to lose weight and keep it off? Patient hopes having support, a set plan, and changes in hunger this will help her manage her health and promote weight loss. Dietary Instructions    Nutritional Hx: What is the number of meals you eat per day?  1- large meal with excessive portions   Comment: Do you eat between meals / snack? yes  Typical snack: reports not snacking - does drink pureed vegetables     How fast do you eat your meals? slow    How often do you eat fast food? 1 times a week    How many sodas/sugared beverages do you drink per day? None     How many caffeinated drinks do you have per day? None     How much milk and/or juice do you drink per day? Cranberry juice - throughout the day     How much water do you drink per day? 12-13 (8oz glasses)    How often do you consume alcohol? Never;    Current Vitamins: None    Diet History:    Typical intake is as follows:  Breakfast: SKIPS  Lunch: SKIPS  Dinner: [employer] Chicken Pot Pie (up to 3 in one sitting)  with salad greens  Snacks:   Fluids: 100-120 oz water    Reviewed intake  Understanding low carbohydrates, low sugar, higher protein meals  Understanding proper portions  Dining outside home  Instruction given for personal dietary changes  Discussed perceived compliance  Comments: Pt given brief pre/post-op diet ed and diet hx reviewed. Patient Education and Materials Provided:  MVI Recommendations, Protein Supplement Information, Fluid Requirements, No Caffeine or Carbonation, No Alcohol for One Year Post Op, 3 Balanced Meals a Day, Food Group Guide, Good Choices Dining Out, No Snacks, No Concentrated Sweets, Support System at Home, Exercising, Support Group Information and Addressed Current Habits / Changes to make  Physical Activity/Exercise    Discussed Perceived Compliance  Reasonable Goals Set  Motivation  Comments: none    Exercise:  Do you currently have an exercise routine? no    Goals: Work to increase to 3 meals per day, with planned snacks as needed. Recommend following plate method for meal planning - focusing on lean protein, non-starchy vegetables, and measured amounts of starch. - Goal of  g protein and  g carbohydrate per day.     - Avoid meal skipping OR going longer than 4-5 hours without a protein meal or snack  2. Increase non caloric fluid to 64 oz per day. Eliminate caffeine, added sugar, carbonation, and straws. 3. Start complete MVI - may take prenatal vitamin, will need to switch to chewable for at least 1 month post -op (no gummies)  4. Work to increase activity - try walking for 10-20 minutes per day. Candidate for surgery (per RD):  PENDING   Mariana Kennedy, GERMAN  05/06/19

## 2019-05-07 LAB
ESTRADIOL SERPL-MCNC: 215.5 PG/ML
FSH SERPL-ACNC: 23.6 MIU/ML
IGF-I SERPL-MCNC: 152 NG/ML (ref 42–169)
LH SERPL-ACNC: 18.9 MIU/ML
TSH SERPL DL<=0.05 MIU/L-ACNC: 1.34 UIU/ML (ref 0.36–3.74)

## 2019-05-07 NOTE — PROGRESS NOTES
Patient came in at at 14:22 on 5/6/2019, card from admissions checked for ekg and labs. Called admissions because I saw no order scanned, patient had no additional paper work except lab orders in hand and there were electronic lab orders. Registrar had left for the day, there was no posting sheet but I could see she was having an endo procedure on Wednesday. I called PAT nurse spoke to Fabiana and we determined that given her BMI she would need an ekg under anesthesia guidelines but Fabiana said she could not put the order in right now so I said I could do it for her and I would note our conversation. The patient and I had the conversation that she was going to have a scope down her throat and she would need anesthesia hence an ekg. The ekg was performed and I sent the patient to the lab. I called Shaji's office to confirm the procedure she was having but the the answering service answered. I spoke to Shaji's office this morning and the patient is having an upper GI xray with contrast and did not need the ekg or to have anyone come with her on Wednesday for the procedure. I immediately called the patient at  0930 and apologized for my misinformation concerning the procedure. The patient thanked me for calling her and verifying the information.

## 2019-05-08 LAB
ATRIAL RATE: 69 BPM
CALCULATED P AXIS, ECG09: 38 DEGREES
CALCULATED R AXIS, ECG10: 35 DEGREES
CALCULATED T AXIS, ECG11: -8 DEGREES
DIAGNOSIS, 93000: NORMAL
P-R INTERVAL, ECG05: 142 MS
Q-T INTERVAL, ECG07: 402 MS
QRS DURATION, ECG06: 74 MS
QTC CALCULATION (BEZET), ECG08: 430 MS
VENTRICULAR RATE, ECG03: 69 BPM

## 2019-05-15 LAB
FAX TO INFO,FAXT: NORMAL
FAX TO NUMBER,FAXN: NORMAL
H PYLORI IGA SER-ACNC: <9 UNITS (ref 0–8.9)
H PYLORI IGG SER IA-ACNC: <0.8 INDEX VALUE (ref 0–0.79)
H PYLORI IGM SER-ACNC: <9 UNITS (ref 0–8.9)

## 2019-06-12 ENCOUNTER — APPOINTMENT (OUTPATIENT)
Dept: GENERAL RADIOLOGY | Age: 64
End: 2019-06-12
Attending: SPECIALIST
Payer: MEDICARE

## 2019-06-12 ENCOUNTER — HOSPITAL ENCOUNTER (OUTPATIENT)
Age: 64
Setting detail: OUTPATIENT SURGERY
Discharge: HOME | End: 2019-06-12
Attending: SPECIALIST | Admitting: SPECIALIST
Payer: MEDICARE

## 2019-06-12 VITALS
OXYGEN SATURATION: 98 % | WEIGHT: 280.7 LBS | DIASTOLIC BLOOD PRESSURE: 59 MMHG | HEART RATE: 63 BPM | SYSTOLIC BLOOD PRESSURE: 109 MMHG | TEMPERATURE: 97.6 F | HEIGHT: 64 IN | BODY MASS INDEX: 47.92 KG/M2

## 2019-06-12 DIAGNOSIS — E66.01 MORBID OBESITY (HCC): ICD-10-CM

## 2019-06-12 PROCEDURE — 74240 X-RAY XM UPR GI TRC 1CNTRST: CPT

## 2019-06-12 PROCEDURE — 76040000019: Performed by: SPECIALIST

## 2019-06-12 PROCEDURE — 77030032490 HC SLV COMPR SCD KNE COVD -B: Performed by: SPECIALIST

## 2019-06-12 PROCEDURE — 74011000255 HC RX REV CODE- 255: Performed by: SPECIALIST

## 2019-06-12 NOTE — PROCEDURES
Patient:Carlos Manuel Nielsen   : 1955  Medical Record LGLFOF:[id number]            PREPROCEDURE DIAGNOSIS: This patient is preoperative for laparoscopic gastric bypass surgeryprocedure with a history of  reflux disease. POSTPROCEDURE DIAGNOSIS: This patient is preoperative for laparoscopic gastric bypass surgeryprocedure with a history of  reflux disease. PROCEDURES PERFORMED: Upper GI study with barium. ESTIMATED BLOOD LOSS: None. SPECIMENS: None. STATEMENT OF MEDICAL NECESSITY: The patient is a patient with a  longstanding history of obesity. They are now considering the laparoscopic gastric bypass surgeryprocedure as a means of surgical weight control and due to their history of reflux disease and are being assessed preoperatively for such. DESCRIPTION OF PROCEDURE: The patient was brought to the fluoroscopy unit and  was given thin barium. On swallowing of barium, they were noted to have  normal peristalsis of their esophagus. They had prompt filling of distal  esophagus with tapering into the gastroesophageal junction. There was no evidence of a hiatal hernia present. Contrast then filled the gastric cardia, fundus,body and pre pyloric region with no abnormalities noted. Contrast then exited the pylorus in normal fashion. No obstruction was noted. There was evidence of reflux noted.     (severe reflux with normal anatomy)    Vilma Malcolm MD

## 2019-06-26 ENCOUNTER — CLINICAL SUPPORT (OUTPATIENT)
Dept: SURGERY | Age: 64
End: 2019-06-26

## 2019-06-26 VITALS — BODY MASS INDEX: 47.29 KG/M2 | WEIGHT: 277 LBS | HEIGHT: 64 IN

## 2019-06-26 DIAGNOSIS — E66.01 MORBID OBESITY (HCC): Primary | ICD-10-CM

## 2019-06-26 NOTE — PATIENT INSTRUCTIONS
Goals; 1. Continue working to eat three meals a day focusing on protein and non-starchy vegetables continue using your protein shake    2. Continue drinking 64 ounces of non-caloric fluid a day     3. Continue walking 4 days a week for 30 minutes     4.  Continue taking your daily MVI

## 2019-06-26 NOTE — PROGRESS NOTES
Medical Weight Loss Multi-Disciplinary Program    Name: Marla Enamorado   : 1955    Session# 2  Date: 2019     Height: 5' 4\" (162.6 cm)    Weight: 125.6 kg (277 lb) lbs. Body mass index is 47.55 kg/m². Pounds Lost: 5     Patient showed up 25 minutes late for her appointment - patient was seen during remaining appointment time due to it being the end of the month and no free slots were available the rest of the week. Dietary Instructions    Reviewed intake  Instruction given for personal dietary changes  Discussed perceived compliance  Comments: reviewed patient's past monthly. Patient has been eating two meals a day using a protein shake (HN2) as meal replacement for dinner. She's also stopped eating beef, but is eating chicken and fish/shellfish. She states she's taking vitamins in place of most protein. She is drinking 64 ounces of non-caloric fluid a day     Physical Activity/Exercise    Reviewed Activity Log  Discussed Perceived Compliance  Reasonable Goals Set  Motivation  Comments: patient is walking 4 days a week for 30 minutes     Behavior Modification    Reviewed behavior modification log  Identify obstacles to trigger change  Achieving/Rewarding goals met  Positive attitude  Discussed perceived compliance  Comments:     Goals;  1. Continue working to eat three meals a day focusing on protein and non-starchy vegetables continue using your protein shake    2. Continue drinking 64 ounces of non-caloric fluid a day     3. Continue walking 4 days a week for 30 minutes     4. Continue taking your daily MVI     Candidate for surgery (per RD):  PENDING     Dietitian: Zach Hawk

## 2019-07-25 ENCOUNTER — CLINICAL SUPPORT (OUTPATIENT)
Dept: SURGERY | Age: 64
End: 2019-07-25

## 2019-07-25 VITALS — WEIGHT: 272.5 LBS | BODY MASS INDEX: 46.52 KG/M2 | HEIGHT: 64 IN

## 2019-07-25 DIAGNOSIS — E66.01 MORBID OBESITY WITH BMI OF 45.0-49.9, ADULT (HCC): Primary | ICD-10-CM

## 2019-07-25 NOTE — PROGRESS NOTES
Medical Weight Loss Multi-Disciplinary Program    Name: Dottie Ashraf   : 1955    Session# 3  Date: 2019     Height: 5' 4\" (162.6 cm)    Weight: 123.6 kg (272 lb 8 oz) lbs. Body mass index is 46.77 kg/m². Pounds Lost: 4.5 lbs Pounds Gained:     Dietary Instructions    Reviewed intake  Understanding low carbohydrates, low sugar, higher protein meals  Understanding proper portions  Dining outside home  Instruction given for personal dietary changes  Discussed perceived compliance  Comments: Reviewed recent intake, recommend patient focus on reducing added calories from beverages, limit 100% juice to 4 oz per day. Recommend patient aim to try eat at regularly scheduled times, following plate method for intake regimen. Patient current meal replacement option exceeds recommended fat, carbohydrate, and sugar following surgery- suggested alternative choice and provided list of options and label guidelines. Typical intake is as follows:  Breakfast: 2 hard boiled egg OR poached eggs OR 2 egg omelette with peppers, onions, and spinach   Lunch: Celery, carrots, cucumbers, with feta cheese -Patient is consuming TwoCal HN (425 calories 50 g CHO, 21 g fat, 19 g protein)   Dinner: Flounder with green beans and cucumbers   Snacks: 1 oz nuts OR 1/2 cup fruit  Fluids: 80 oz water, 12 oz 100 % fruit juice    Physical Activity/Exercise    Discussed Perceived Compliance  Reasonable Goals Set  Comments: Patient has been swimming 4-5 days per week for 30 minutes. Behavior Modification    Identify obstacles to trigger change  Achieving/Rewarding goals met  Positive attitude  Comments: none    Candidate for surgery (per RD):  PENDING     Dietitian: Kunal Ronquillo

## 2019-08-28 ENCOUNTER — OFFICE VISIT (OUTPATIENT)
Dept: SURGERY | Age: 64
End: 2019-08-28

## 2019-08-28 ENCOUNTER — CLINICAL SUPPORT (OUTPATIENT)
Dept: SURGERY | Age: 64
End: 2019-08-28

## 2019-08-28 VITALS — WEIGHT: 276 LBS | HEIGHT: 64 IN | BODY MASS INDEX: 47.12 KG/M2

## 2019-08-28 VITALS
SYSTOLIC BLOOD PRESSURE: 140 MMHG | BODY MASS INDEX: 47.12 KG/M2 | OXYGEN SATURATION: 99 % | DIASTOLIC BLOOD PRESSURE: 82 MMHG | WEIGHT: 276 LBS | HEIGHT: 64 IN | RESPIRATION RATE: 16 BRPM | TEMPERATURE: 97.6 F | HEART RATE: 76 BPM

## 2019-08-28 DIAGNOSIS — K21.9 GASTROESOPHAGEAL REFLUX DISEASE WITHOUT ESOPHAGITIS: ICD-10-CM

## 2019-08-28 DIAGNOSIS — I10 HYPERTENSION, UNSPECIFIED TYPE: ICD-10-CM

## 2019-08-28 DIAGNOSIS — E78.5 HYPERLIPIDEMIA, UNSPECIFIED HYPERLIPIDEMIA TYPE: ICD-10-CM

## 2019-08-28 DIAGNOSIS — J45.909 ASTHMA, UNSPECIFIED ASTHMA SEVERITY, UNSPECIFIED WHETHER COMPLICATED, UNSPECIFIED WHETHER PERSISTENT: ICD-10-CM

## 2019-08-28 DIAGNOSIS — E66.01 MORBID OBESITY WITH BMI OF 45.0-49.9, ADULT (HCC): Primary | ICD-10-CM

## 2019-08-28 DIAGNOSIS — E66.01 MORBID OBESITY (HCC): Primary | ICD-10-CM

## 2019-08-28 DIAGNOSIS — E03.9 HYPOTHYROIDISM, UNSPECIFIED TYPE: ICD-10-CM

## 2019-08-28 DIAGNOSIS — E11.9 TYPE 2 DIABETES MELLITUS WITHOUT COMPLICATION, WITHOUT LONG-TERM CURRENT USE OF INSULIN (HCC): ICD-10-CM

## 2019-08-28 DIAGNOSIS — E66.01 MORBID OBESITY WITH BMI OF 45.0-49.9, ADULT (HCC): ICD-10-CM

## 2019-08-28 NOTE — PATIENT INSTRUCTIONS
Body Mass Index: Care Instructions  Your Care Instructions    Body mass index (BMI) can help you see if your weight is raising your risk for health problems. It uses a formula to compare how much you weigh with how tall you are. · A BMI lower than 18.5 is considered underweight. · A BMI between 18.5 and 24.9 is considered healthy. · A BMI between 25 and 29.9 is considered overweight. A BMI of 30 or higher is considered obese. If your BMI is in the normal range, it means that you have a lower risk for weight-related health problems. If your BMI is in the overweight or obese range, you may be at increased risk for weight-related health problems, such as high blood pressure, heart disease, stroke, arthritis or joint pain, and diabetes. If your BMI is in the underweight range, you may be at increased risk for health problems such as fatigue, lower protection (immunity) against illness, muscle loss, bone loss, hair loss, and hormone problems. BMI is just one measure of your risk for weight-related health problems. You may be at higher risk for health problems if you are not active, you eat an unhealthy diet, or you drink too much alcohol or use tobacco products. Follow-up care is a key part of your treatment and safety. Be sure to make and go to all appointments, and call your doctor if you are having problems. It's also a good idea to know your test results and keep a list of the medicines you take. How can you care for yourself at home? · Practice healthy eating habits. This includes eating plenty of fruits, vegetables, whole grains, lean protein, and low-fat dairy. · If your doctor recommends it, get more exercise. Walking is a good choice. Bit by bit, increase the amount you walk every day. Try for at least 30 minutes on most days of the week. · Do not smoke. Smoking can increase your risk for health problems. If you need help quitting, talk to your doctor about stop-smoking programs and medicines. These can increase your chances of quitting for good. · Limit alcohol to 2 drinks a day for men and 1 drink a day for women. Too much alcohol can cause health problems. If you have a BMI higher than 25  · Your doctor may do other tests to check your risk for weight-related health problems. This may include measuring the distance around your waist. A waist measurement of more than 40 inches in men or 35 inches in women can increase the risk of weight-related health problems. · Talk with your doctor about steps you can take to stay healthy or improve your health. You may need to make lifestyle changes to lose weight and stay healthy, such as changing your diet and getting regular exercise. If you have a BMI lower than 18.5  · Your doctor may do other tests to check your risk for health problems. · Talk with your doctor about steps you can take to stay healthy or improve your health. You may need to make lifestyle changes to gain or maintain weight and stay healthy, such as getting more healthy foods in your diet and doing exercises to build muscle. Where can you learn more? Go to http://yas-alverto.info/. Enter S176 in the search box to learn more about \"Body Mass Index: Care Instructions. \"  Current as of: March 28, 2019  Content Version: 12.1  © 8930-4438 [employer]. Care instructions adapted under license by [employer] (which disclaims liability or warranty for this information). If you have questions about a medical condition or this instruction, always ask your healthcare professional. [address] any warranty or liability for your use of this information.

## 2019-08-28 NOTE — PROGRESS NOTES
Bariatric Surgery Consultation    Subjective:     Parviz Perkins is a 70 y.o. obese female with a Body mass index is 47.38 kg/m². Hai Olson she desires surgery at this time because of health issues and quality of life issues. Parviz Perkins has been seen by a bariatric nutritionist and has been placed on an appropriate low carbohydrate diet. The patient desires laparoscopic gastric bypass surgery for surgical weight loss, however she is here today to review their workup to date. Parviz Perkins is here also today to check progress with weight loss / evaluate nutritional status and review all subspecialty clearances in hopes of proceeding to the operating room. Patient Active Problem List    Diagnosis Date Noted    Morbid obesity with BMI of 45.0-49.9, adult ([facility] 04/15/2019    Hypertension     Hyperlipidemia     Asthma     Diabetes ([facility]     GERD (gastroesophageal reflux disease)     Hypothyroid     Low back pain     Arthritis of knee     Obesity, morbid ([facility] 07/26/2018      Past Surgical History:   Procedure Laterality Date    ABDOMEN SURGERY PROC UNLISTED      abdominoplasty    HX HYSTERECTOMY      HX OTHER SURGICAL  01/28/2019    [facility]      pituitary [facility] for adenoma      Social History     Tobacco Use    Smoking status: Never Smoker    Smokeless tobacco: Never Used   Substance Use Topics    Alcohol use: No      Family History   Problem Relation Age of Onset    Pancreatic Cancer Mother     Diabetes Father     Thyroid Disease Maternal Grandmother     Other Maternal Grandmother         pituitary tumor      Current Outpatient Medications   Medication Sig Dispense Refill    ibuprofen (ADVIL) 100 mg tablet Take 100 mg by mouth every six (6) hours as needed for Pain.  levothyroxine (SYNTHROID) 75 mcg tablet Take  by mouth Daily (before breakfast).       metFORMIN (GLUCOPHAGE) 1,000 mg tablet Take 1,000 mg by mouth two (2) times daily (with meals).  VENTOLIN HFA 90 mcg/actuation inhaler INHALE 2 PUFFS BY MOUTH EVERY 6 HOURS  1    aspirin delayed-release 81 mg tablet Take 81 mg by mouth.  cetirizine (ZYRTEC) 10 mg tablet TAKE 1 BY MOUTH IN THE MORNING  6    pantoprazole (PROTONIX) 40 mg tablet TAKE 1 TABLET BY MOUTH DAILY  0    hydroCHLOROthiazide (HYDRODIURIL) 25 mg tablet Take 25 mg by mouth daily.  atorvastatin (LIPITOR) 40 mg tablet Take 40 mg by mouth daily.        No Known Allergies       Review of Systems:            General - No history or complaints of unexpected fever, chills, or weight loss  Head/Neck - No history or complaints of headache, diplopia, dysphagia, hearing loss  Cardiac - No history or complaints of chest pain, palpitations, murmur, or shortness of breath  Pulmonary - No history or complaints of shortness of breath, productive cough, hemoptysis  Gastrointestinal - No history or complaints of reflux,  abdominal pain, obstipation/constipation, blood per rectum  Genitourinary - No history or complaints of hematuria/dysuria, stress urinary incontinence symptoms, or renal lithiasis  Musculoskeletal - No history or complaints of joint pain or muscular weakness  Hematologic - No history or complaints of bleeding disorders, blood transfusions, sickle cell anemia  Neurologic - No history or complaints of  migraine headaches, seizure activity, syncopal episodes, TIA or stroke  Integumentary - No history or complaints of rashes, abnormal nevi, skin cancer  Gynecological - n/a           Objective:     Visit Vitals  /82 (BP 1 Location: Left arm, BP Patient Position: Sitting)   Pulse 76   Temp 97.6 °F (36.4 °C)   Resp 16   Ht 5' 4\" (1.626 m)   Wt 125.2 kg (276 lb)   SpO2 99%   BMI 47.38 kg/m²       Physical Examination: General appearance - alert, well appearing, and in no distress and oriented to person, place, and time  Mental status - alert, oriented to person, place, and time, normal mood, behavior, speech, dress, motor activity, and thought processes  Eyes - pupils equal and reactive, extraocular eye movements intact, sclera anicteric, left eye normal, right eye normal  Ears - right ear normal, left ear normal  Nose - normal and patent, no erythema, discharge or polyps  Mouth - mucous membranes moist, pharynx normal without lesions  Neck - supple, no significant adenopathy  Chest - clear to auscultation, no wheezes, rales or rhonchi, symmetric air entry  Heart - normal rate, regular rhythm, normal S1, S2, no murmurs, rubs, clicks or gallops  Abdomen - soft, nontender, nondistended, no masses or organomegaly, very tight abdominoplasty  Back exam - full range of motion, no tenderness, palpable spasm or pain on motion  Neurological - alert, oriented, normal speech, no focal findings or movement disorder noted  Musculoskeletal - no joint tenderness, deformity or swelling  Extremities - peripheral pulses normal, no pedal edema, no clubbing or cyanosis    Labs:     No results found for this or any previous visit (from the past 2016 hour(s)). Assessment:     Morbid obesity with associated comorbidity     Plan:     Continuation of Pre-Operative evaluation / clearance. Blaine Hair has returned to the office today to discuss her status as a surgical candidate.  her progress has been noted and reviewed. We will continue the pre-operative process and work towards goals as outlined. she has 0 more pounds to lose before proceeding to the OR. (6 pounds lost since initial visit)  she has 0 more nutritional visits to complete before proceeding to the OR  she has an outstanding   clearance to review before proceeding to the OR. Carlos Manule Nielsen understand the rationales for all the above. It has been discussed that given her   condition that the best surgical option for this patient would be the laparoscopic gastric bypass surgery.   Blaine Hair agrees with the surgical choice and has been educated in it's; risks, benefits, and alternatives. We will continue with the pre-operative evaluation as needed to check progress.     Secondary Diagnoses:         Signed By: Rola Chua MD     August 28, 2019

## 2019-08-28 NOTE — PROGRESS NOTES
Abdifatah Isaac presents today for   Chief Complaint   Patient presents with    Follow-up     Pt is here today for her follow up       Is someone accompanying this pt? no    Is the patient using any DME equipment during OV? no    Depression Screening:  3 most recent PHQ Screens 8/28/2019   Little interest or pleasure in doing things Not at all   Feeling down, depressed, irritable, or hopeless Not at all   Total Score PHQ 2 0       Learning Assessment:  Learning Assessment 8/28/2019   PRIMARY LEARNER Patient   HIGHEST LEVEL OF EDUCATION - PRIMARY LEARNER  4 YEARS [facility] PRIMARY LEARNER NONE   CO-LEARNER CAREGIVER No   PRIMARY LANGUAGE ENGLISH    NEED No   LEARNER PREFERENCE PRIMARY DEMONSTRATION   LEARNING SPECIAL TOPICS no   ANSWERED BY patient   RELATIONSHIP SELF       Abuse Screening:  Abuse Screening Questionnaire 8/28/2019   Do you ever feel afraid of your partner? N   Are you in a relationship with someone who physically or mentally threatens you? N   Is it safe for you to go home? Y       Fall Risk  Fall Risk Assessment, last 12 mths 8/28/2019   Able to walk? Yes   Fall in past 12 months? No   Number of falls in past 12 months -         Coordination of Care:  1. Have you been to the ER, urgent care clinic since your last visit? Hospitalized since your last visit? no    2. Have you seen or consulted any other health care providers outside of the [address] [place] [address] since your last visit? Include any pap smears or colon screening.  no

## 2019-10-16 DIAGNOSIS — K21.9 GASTROESOPHAGEAL REFLUX DISEASE WITHOUT ESOPHAGITIS: ICD-10-CM

## 2019-10-16 DIAGNOSIS — E66.01 OBESITY, MORBID (HCC): ICD-10-CM

## 2019-10-16 DIAGNOSIS — I10 HYPERTENSION, UNSPECIFIED TYPE: ICD-10-CM

## 2019-10-16 DIAGNOSIS — E11.9 TYPE 2 DIABETES MELLITUS WITHOUT COMPLICATION, UNSPECIFIED WHETHER LONG TERM INSULIN USE (HCC): Primary | ICD-10-CM

## 2019-10-16 DIAGNOSIS — Z01.812 BLOOD TESTS PRIOR TO TREATMENT OR PROCEDURE: ICD-10-CM

## 2019-10-21 ENCOUNTER — OFFICE VISIT (OUTPATIENT)
Dept: SURGERY | Age: 64
End: 2019-10-21

## 2019-10-21 ENCOUNTER — NURSE NAVIGATOR (OUTPATIENT)
Dept: SURGERY | Age: 64
End: 2019-10-21

## 2019-10-21 VITALS
WEIGHT: 281 LBS | DIASTOLIC BLOOD PRESSURE: 79 MMHG | TEMPERATURE: 98.6 F | BODY MASS INDEX: 47.97 KG/M2 | HEIGHT: 64 IN | SYSTOLIC BLOOD PRESSURE: 140 MMHG | OXYGEN SATURATION: 100 % | HEART RATE: 82 BPM

## 2019-10-21 DIAGNOSIS — E66.01 OBESITY, MORBID (HCC): Primary | ICD-10-CM

## 2019-10-21 DIAGNOSIS — G89.18 POST-OPERATIVE PAIN: Primary | ICD-10-CM

## 2019-10-21 DIAGNOSIS — E66.01 MORBID OBESITY WITH BMI OF 45.0-49.9, ADULT (HCC): ICD-10-CM

## 2019-10-21 DIAGNOSIS — E66.01 MORBID OBESITY WITH BMI OF 45.0-49.9, ADULT (HCC): Primary | ICD-10-CM

## 2019-10-21 DIAGNOSIS — I10 HYPERTENSION, UNSPECIFIED TYPE: ICD-10-CM

## 2019-10-21 DIAGNOSIS — M17.10 ARTHRITIS OF KNEE: ICD-10-CM

## 2019-10-21 DIAGNOSIS — K21.9 GASTROESOPHAGEAL REFLUX DISEASE WITHOUT ESOPHAGITIS: ICD-10-CM

## 2019-10-21 DIAGNOSIS — J45.909 ASTHMA, UNSPECIFIED ASTHMA SEVERITY, UNSPECIFIED WHETHER COMPLICATED, UNSPECIFIED WHETHER PERSISTENT: ICD-10-CM

## 2019-10-21 DIAGNOSIS — E11.9 TYPE 2 DIABETES MELLITUS WITHOUT COMPLICATION, UNSPECIFIED WHETHER LONG TERM INSULIN USE (HCC): ICD-10-CM

## 2019-10-21 RX ORDER — ENOXAPARIN SODIUM 100 MG/ML
40 INJECTION SUBCUTANEOUS EVERY 12 HOURS
Qty: 14 SYRINGE | Refills: 0 | Status: ON HOLD | OUTPATIENT
Start: 2019-10-21 | End: 2019-10-30

## 2019-10-21 RX ORDER — DEXTROAMPHETAMINE SACCHARATE, AMPHETAMINE ASPARTATE, DEXTROAMPHETAMINE SULFATE AND AMPHETAMINE SULFATE 7.5; 7.5; 7.5; 7.5 MG/1; MG/1; MG/1; MG/1
TABLET ORAL
COMMUNITY
Start: 2019-04-26 | End: 2019-11-14

## 2019-10-21 RX ORDER — OXYCODONE AND ACETAMINOPHEN 5; 325 MG/1; MG/1
1 TABLET ORAL
Qty: 30 TAB | Refills: 0 | Status: SHIPPED | OUTPATIENT
Start: 2019-10-21 | End: 2019-10-24

## 2019-10-21 RX ORDER — OMEPRAZOLE 20 MG/1
20 CAPSULE, DELAYED RELEASE ORAL DAILY
Qty: 30 CAP | Refills: 2 | Status: SHIPPED | OUTPATIENT
Start: 2019-10-21 | End: 2020-01-19

## 2019-10-21 NOTE — LETTER
Carlos Manuel Nielsen's Medication List 
 
Current Outpatient Medications on File Prior to Visit Medication Sig Dispense Refill  DULoxetine (CYMBALTA) 20 mg capsule Take 20 mg by mouth daily.  ibuprofen (ADVIL) 100 mg tablet Take 100 mg by mouth every six (6) hours as needed for Pain.  levothyroxine (SYNTHROID) 75 mcg tablet Take  by mouth Daily (before breakfast).  metFORMIN (GLUCOPHAGE) 1,000 mg tablet Take 1,000 mg by mouth two (2) times daily (with meals).  VENTOLIN HFA 90 mcg/actuation inhaler INHALE 2 PUFFS BY MOUTH EVERY 6 HOURS  1  
 aspirin delayed-release 81 mg tablet Take 81 mg by mouth.  cetirizine (ZYRTEC) 10 mg tablet Take 10 mg by mouth daily as needed. 6  
 pantoprazole (PROTONIX) 40 mg tablet TAKE 1 TABLET BY MOUTH DAILY  0  
 hydroCHLOROthiazide (HYDRODIURIL) 25 mg tablet Take 25 mg by mouth daily.  atorvastatin (LIPITOR) 40 mg tablet Take 40 mg by mouth daily. No current facility-administered medications on file prior to visit.

## 2019-10-21 NOTE — PATIENT INSTRUCTIONS
Preparation for Surgery Refer to your book for specific instructions 1. Stop taking all aspirin products, ibuprofen products, non-steroidal medications, blood thinners,  and herbal supplements as outlined in your book. 2. Absolutely no smoking. 3. If diabetic, monitor blood sugars regularly and alert the office of blood sugars over 200. 
 
4. Have a supply of protein product and liquid diet items for your first two weeks as outlined in your book. 5. The day before your surgery is scheduled: 
6.  
? Gastric Bypass and Sleeve:  Clear liquids and Protein Shakes ? Gastric Band:   Eat lightly. No snacking. ? Drink lots of water 6. Get prepared to meet a new you! Learning About Physical Activity What is physical activity? Physical activity is any kind of activity that gets your body moving. The types of physical activity that can help you get fit and stay healthy include: · Aerobic or \"cardio\" activities that make your heart beat faster and make you breathe harder, such as brisk walking, riding a bike, or running. Aerobic activities strengthen your heart and lungs and build up your endurance. · Strength training activities that make your muscles work against, or \"resist,\" something, such as lifting weights or doing push-ups. These activities help tone and strengthen your muscles. · Stretches that allow you to move your joints and muscles through their full range of motion. Stretching helps you be more flexible and avoid injury. What are the benefits of physical activity? Being active is one of the best things you can do to get fit and stay healthy. It helps you to: · Feel stronger and have more energy to do all the things you like to do. · Focus better at school or work and perform better in sports. · Feel, think, and sleep better. · Reach and stay at a healthy weight. · Lose fat and build lean muscle. · Lower your risk for serious health problems. · Keep your bones, muscles, and joints strong. Being fit lets you do more physical activity. And it lets you work out harder without as much effort. How can you make physical activity part of your life? Get at least 30 minutes of exercise on most days of the week. Walking is a good choice. You also may want to do other activities, such as running, swimming, cycling, or playing tennis or team sports. Pick activities that you likeones that make your heart beat faster, your muscles stronger, and your muscles and joints more flexible. If you find more than one thing you like doing, do them all. You don't have to do the same thing every day. Get your heart pumping every day. Any activity that makes your heart beat faster and keeps it at that rate for a while counts. Here are some great ways to get your heart beating faster: · Go for a brisk walk, run, or bike ride. · Go for a hike or swim. · Go in-line skating. · Play a game of touch football, basketball, or soccer. · Ride a bike. · Play tennis or racquetball. · Climb stairs. Even some household chores can be aerobicjust do them at a faster pace. Vacuuming, raking or mowing the lawn, sweeping the garage, and washing and waxing the car all can help get your heart rate up. Strengthen your muscles during the week. You don't have to lift heavy weights or grow big, bulky muscles to get stronger. Doing a few simple activities that make your muscles work against, or \"resist,\" something can help you get stronger. For example, you can: · Do push-ups or sit-ups, which use your own body weight as resistance. · Lift weights or dumbbells or use stretch bands at home or in a gym or community center. Stretch your muscles often. Stretching will help you as you become more active. It can help you stay flexible, loosen tight muscles, and avoid injury. It can also help improve your balance and posture and can be a great way to relax. Be sure to stretch the muscles you'll be using when you work out. It's best to warm your muscles slightly before you stretch them. Walk or do some other light aerobic activity for a few minutes, and then start stretching. When you stretch your muscles: · Do it slowly. Stretching is not about going fast or making sudden movements. · Don't push or bounce during a stretch. · Hold each stretch for at least 15 to 30 seconds, if you can. You should feel a stretch in the muscle, but not pain. · Breathe out as you do the stretch. Then breathe in as you hold the stretch. Don't hold your breath. If you're worried about how more activity might affect your health, have a checkup before you start. Follow any special advice your doctor gives you for getting a smart start. Where can you learn more? Go to http://yasPoshVinealverto.info/. Enter A362 in the search box to learn more about \"Learning About Physical Activity. \" Current as of: May 5, 2019 Content Version: 12.2 © 9978-4158 "[employer] Care instructions adapted under license by [employer] (which disclaims liability or warranty for this information). If you have questions about a medical condition or this instruction, always ask your healthcare professional. [address] any warranty or liability for your use of this information. Body Mass Index: Care Instructions Your Care Instructions Body mass index (BMI) can help you see if your weight is raising your risk for health problems. It uses a formula to compare how much you weigh with how tall you are. · A BMI lower than 18.5 is considered underweight. · A BMI between 18.5 and 24.9 is considered healthy. · A BMI between 25 and 29.9 is considered overweight. A BMI of 30 or higher is considered obese.  
If your BMI is in the normal range, it means that you have a lower risk for weight-related health problems. If your BMI is in the overweight or obese range, you may be at increased risk for weight-related health problems, such as high blood pressure, heart disease, stroke, arthritis or joint pain, and diabetes. If your BMI is in the underweight range, you may be at increased risk for health problems such as fatigue, lower protection (immunity) against illness, muscle loss, bone loss, hair loss, and hormone problems. BMI is just one measure of your risk for weight-related health problems. You may be at higher risk for health problems if you are not active, you eat an unhealthy diet, or you drink too much alcohol or use tobacco products. Follow-up care is a key part of your treatment and safety. Be sure to make and go to all appointments, and call your doctor if you are having problems. It's also a good idea to know your test results and keep a list of the medicines you take. How can you care for yourself at home? · Practice healthy eating habits. This includes eating plenty of fruits, vegetables, whole grains, lean protein, and low-fat dairy. · If your doctor recommends it, get more exercise. Walking is a good choice. Bit by bit, increase the amount you walk every day. Try for at least 30 minutes on most days of the week. · Do not smoke. Smoking can increase your risk for health problems. If you need help quitting, talk to your doctor about stop-smoking programs and medicines. These can increase your chances of quitting for good. · Limit alcohol to 2 drinks a day for men and 1 drink a day for women. Too much alcohol can cause health problems. If you have a BMI higher than 25 · Your doctor may do other tests to check your risk for weight-related health problems. This may include measuring the distance around your waist. A waist measurement of more than 40 inches in men or 35 inches in women can increase the risk of weight-related health problems. · Talk with your doctor about steps you can take to stay healthy or improve your health. You may need to make lifestyle changes to lose weight and stay healthy, such as changing your diet and getting regular exercise. If you have a BMI lower than 18.5 · Your doctor may do other tests to check your risk for health problems. · Talk with your doctor about steps you can take to stay healthy or improve your health. You may need to make lifestyle changes to gain or maintain weight and stay healthy, such as getting more healthy foods in your diet and doing exercises to build muscle. Where can you learn more? Go to http://yas-alverto.info/. Enter S176 in the search box to learn more about \"Body Mass Index: Care Instructions. \" Current as of: March 28, 2019 Content Version: 12.2 © 4066-5538 [employer]. Care instructions adapted under license by [employer] (which disclaims liability or warranty for this information). If you have questions about a medical condition or this instruction, always ask your healthcare professional. [address] any warranty or liability for your use of this information.

## 2019-10-21 NOTE — H&P (VIEW-ONLY)
Gastric Bypass - History and Physical 
 
Subjective: The patient is a 75 y.o. obese female with a Body mass index is 48.23 kg/m². .   she presents now to review their work up to date to see if they are a candidate for surgery and whether or not to proceed with the previously requested procedure. Bariatric comorbidities continue to include:  
Patient Active Problem List  
Diagnosis Code  Obesity, morbid ([facility] E66.01  
 Hypertension I10  
 Hyperlipidemia E78.5  Asthma J45.909  Diabetes ([facility] E11.9  GERD (gastroesophageal reflux disease) K21.9  Morbid obesity with BMI of 45.0-49.9, adult ([facility]) E66.01, Z68.42  
 Hypothyroid E03.9  Low back pain M54.5  Arthritis of knee M17.10 They have been generally well prior to this visit and have had no recent significant illnesses. The patient has had no gastrointestinal issues that would preclude them from proceeding with the surgery they have chosen. Praflu Rodriguez has recently tried a preoperative weight loss program  in addition to seeing a bariatric nutritionist preoperatively. We have discussed on at least one other occasion about the various types of surgical weight loss procedures and they have considered these options after our initial consultation. We have once again discussed these procedures in detail and they have now decided on a surgical procedure. They present today to discuss this and confirm that their evaluation pre operatively is acceptable to continue with surgery. The patient desires laparoscopic gastric bypass surgery for surgical weight loss. The patients goal weight is 165 lb. These goals are consistent with expected outcomes of their desired operation. her Medical goals are resolution of these health issues. Since the patient's original consult 6 months ago they have been seen by their PCP for routine medical care.   There has been no change to their overall medical or surgical history and they have been on no steroids in any form. 
  
She has gained 1 lbs over the past 6 months. Has been off metformin for 1 month and blood sugars running around 80. Saw Dr. Tita Clark for cardiac clearance. Note scanned into media tab. TTE showed EF 60-65% with mild CLVH. Negative ischemia on perfusion study. 
  
 
Patient Active Problem List  
 Diagnosis Date Noted  Morbid obesity with BMI of 45.0-49.9, adult ([facility] 04/15/2019  Hypertension  Hyperlipidemia  Asthma  Diabetes ([facility]  GERD (gastroesophageal reflux disease)  Hypothyroid  Low back pain  Arthritis of knee  Obesity, morbid ([facility] 07/26/2018 Past Surgical History:  
Procedure Laterality Date  ABDOMEN SURGERY PROC UNLISTED    
 abdominoplasty  HX HYSTERECTOMY  HX ORTHOPAEDIC Right Sx due to broken ankle- screw placed  HX OTHER SURGICAL  01/28/2019 Pituitary tumor resection, Dr. Charles Fuller  HX TONSILLECTOMY  NEUROLOGICAL PROCEDURE UNLISTED    
 pituitary surgery for adenoma Social History Tobacco Use  Smoking status: Never Smoker  Smokeless tobacco: Never Used Substance Use Topics  Alcohol use: No  
  
Family History Problem Relation Age of Onset  Pancreatic Cancer Mother  Diabetes Father  Thyroid Disease Maternal Grandmother  Other Maternal Grandmother   
     pituitary tumor Current Outpatient Medications Medication Sig Dispense Refill  dextroamphetamine-amphetamine (ADDERALL) 30 mg tablet dextroamphetamine-amphetamine 30 mg tablet TK 1 T PO BID  DULoxetine (CYMBALTA) 20 mg capsule Take 20 mg by mouth daily.  levothyroxine (SYNTHROID) 75 mcg tablet Take  by mouth Daily (before breakfast).  VENTOLIN HFA 90 mcg/actuation inhaler INHALE 2 PUFFS BY MOUTH EVERY 6 HOURS  1  
 cetirizine (ZYRTEC) 10 mg tablet Take 10 mg by mouth daily as needed.   6  
  hydroCHLOROthiazide (HYDRODIURIL) 25 mg tablet Take 25 mg by mouth daily.  omeprazole (PRILOSEC) 20 mg capsule Take 1 Cap by mouth daily. 30 Cap 2  
 oxyCODONE-acetaminophen (PERCOCET) 5-325 mg per tablet Take 1 Tab by mouth every four (4) hours as needed for Pain for up to 3 days. Max Daily Amount: 6 Tabs. 30 Tab 0  
 enoxaparin (LOVENOX) 40 mg/0.4 mL 0.4 mL by SubCUTAneous route every twelve (12) hours every twelve (12) hours for 7 days. Indications: Deep Vein Thrombosis Prevention 14 Syringe 0  
 aspirin delayed-release 81 mg tablet Take 81 mg by mouth. No Known Allergies Review of Systems:  
  
 
 
General - No history or complaints of unexpected fever, chills, or weight loss Head/Neck - No history or complaints of headache, diplopia, dysphagia, hearing loss Cardiac - No history or complaints of chest pain, palpitations, murmur, or shortness of breath Pulmonary - No history or complaints of shortness of breath, productive cough, hemoptysis Gastrointestinal - has reflux controlled with PPI,no  abdominal pain, obstipation/constipation or blood per rectum Genitourinary - No history or complaints of hematuria/dysuria, stress urinary incontinence symptoms, or renal lithiasis Musculoskeletal - has joint pain in their knees and low back,  no muscular weakness Hematologic - No history or complaints of bleeding disorders,  No blood transfusions Neurologic - No history or complaints of  migraine headaches, seizure activity, syncopal episodes, TIA or stroke Integumentary - No history or complaints of rashes, abnormal nevi, skin cancer Gynecological - No abnormal bleeding, has recurrent UTIs from diabetic medications Objective:  
 
Visit Vitals /79 (BP 1 Location: Left arm, BP Patient Position: Sitting) Pulse 82 Temp 98.6 °F (37 °C) Ht 5' 4\" (1.626 m) Wt 127.5 kg (281 lb) SpO2 100% BMI 48.23 kg/m² Physical Examination: General appearance - alert, well appearing, and in no distress and oriented to person, place, and time Mental status - alert, oriented to person, place, and time, normal mood, behavior, speech, dress, motor activity, and thought processes Eyes - pupils equal and reactive, extraocular eye movements intact, sclera anicteric, left eye normal, right eye normal 
Ears - right ear normal, left ear normal 
Nose - normal and patent, no erythema, discharge or polyps Mouth - mucous membranes moist, pharynx normal without lesions Neck - supple, no significant adenopathy Lymphatics - no palpable lymphadenopathy, no hepatosplenomegaly Chest - clear to auscultation, no wheezes, rales or rhonchi, symmetric air entry Heart - normal rate, regular rhythm, normal S1, S2, no murmurs, rubs, clicks or gallops Abdomen - soft, nontender, nondistended, no masses or organomegaly Back exam - full range of motion, no tenderness, palpable spasm or pain on motion Neurological - alert, oriented, normal speech, no focal findings or movement disorder noted Musculoskeletal - no joint tenderness, deformity or swelling Extremities - peripheral pulses normal, no pedal edema, no clubbing or cyanosis Skin - normal coloration and turgor, no rashes, no suspicious skin lesions noted Labs :  
 
Lab Results Component Value Date/Time WBC 5.3 05/06/2019 04:27 PM  
 HGB 12.1 05/06/2019 04:27 PM  
 HCT 38.3 05/06/2019 04:27 PM  
 PLATELET 075 63/55/4388 04:27 PM  
 MCV 81.1 05/06/2019 04:27 PM  
 
Lab Results Component Value Date/Time  Sodium 140 05/06/2019 04:27 PM  
 Potassium 4.0 05/06/2019 04:27 PM  
 Chloride 109 (H) 05/06/2019 04:27 PM  
 CO2 25 05/06/2019 04:27 PM  
 Anion gap 6 05/06/2019 04:27 PM  
 Glucose 97 05/06/2019 04:27 PM  
 BUN 16 05/06/2019 04:27 PM  
 Creatinine 0.82 05/06/2019 04:27 PM  
 BUN/Creatinine ratio 20 05/06/2019 04:27 PM  
 GFR est AA >60 05/06/2019 04:27 PM  
 GFR est non-AA >60 05/06/2019 04:27 PM  
 Calcium 9.4 05/06/2019 04:27 PM  
 Bilirubin, total 0.3 05/06/2019 04:27 PM  
 AST (SGOT) 11 (L) 05/06/2019 04:27 PM  
 Alk. phosphatase 115 05/06/2019 04:27 PM  
 Protein, total 6.8 05/06/2019 04:27 PM  
 Albumin 3.3 (L) 05/06/2019 04:27 PM  
 Globulin 3.5 05/06/2019 04:27 PM  
 A-G Ratio 0.9 05/06/2019 04:27 PM  
 ALT (SGPT) 19 05/06/2019 04:27 PM  
 
No results found for: IRON, FE, TIBC, IBCT, PSAT, FERR No results found for: FOL, RBCF No results found for: Jennifer Wise, Trista Aguilar, Tank Silva, [id number] Cardiac / Pulmonary Evaluation:  
 
Negative perfusion study EF 60-65% UGI Results: The patient was brought to the fluoroscopy unit and was given thin barium. On swallowing of barium, they were noted to have 
normal peristalsis of their esophagus. They had prompt filling of distal esophagus with tapering into the gastroesophageal junction. There was no evidence of a hiatal hernia present. Contrast then filled the gastric cardia, fundus,body and pre pyloric region with no abnormalities noted. Contrast then exited the pylorus in normal fashion. No obstruction was noted. There was evidence of reflux noted. 
  
(severe reflux with normal anatomy) Assessment: Morbid obesity with associated comorbidity Plan:  
 
laparoscopic gastric bypass surgery This is a 75 y.o. female with a BMI of Body mass index is 48.23 kg/m². and the weight-related co-morbidties as noted above. Carlos Manuel meets the NIH criteria for bariatric surgery based upon the BMI of Body mass index is 48.23 kg/m². and multiple weight-related co-morbidties.  Carlos Manuel has elected laparoscopic gastric bypass as her intervention of choice for treatment of morbid obestiy through surgical means secondary to its uniform results,  profound baseline suppression of hunger and pace at which weight is lost. 
 
In the office today, following Carlos Manuel's history and physical examination, a 40 minute discussion regarding the anatomic alterations for the laparoscopic gastric bypass  was undertaken. The dietary expectations and the patient  dependent factors for success were thoroughly discussed, to include the need for interval follow-up and long-term dietary changes associated with success. The possible short and long term  complications of the gastric bypass were also discussed, to include but not limited to;death, DVT/PE, staple line leak, bleeding, stricture formation, infection,internal hernia  and pouch dilation. Specific weight related outcomes for success were also discussed with an emphasis on careful and close follow-up with the first year and Dietary behavior modification over the first years as baseline cyclical hunger returns  The patient expressed an understanding of the above factors, and her questions were answered in their entirety. In addition, the patient attended a 1.5 hour power point seminar regarding obesity, surgical weight loss including, adjustable gastric band, gastric bypass, and sleeve gastrectomy. This discussion contrasted the different surgical techniques, mechanisms of actions and expected outcomes, and surgical and medical risks associated with each procedure. During this seminar, there was a long question and answer session where each questions was answered until there were no additional questions. Today, the patient had all of her questions answered and the decision was made today that the patient's preoperative evaluation is acceptable for them  to proceed with bariatric surgery  choosing  gastric bypass as her surgical option. Secondary Diagnoses:  
 
DVT / Pulmonary Embolus Risk - The patient is at a higher risk for post operative DVT / pulmonary embolus secondary to their morbid obese status, relative sedentary lifestyle, and impending general anesthetic.   We will plan to use anticoagulation therapy pre and post operative as well as compression leg devices and encourage ambulation once on the hospital nursing floor. The need for possible at home anticoagulation therapy has also been discussed and any decision on this matter will be made during post operative evaluations. Signs and symptoms of DVT / PE have been discussed with the patient and they have been instructed to call the office if any these occur in the \"at home\" post op phase. Adult Onset Diabetes - The patient has herbie given a very low carbohydrate diet preoperatively along with instructions to monitor their blood sugars on a regular daily basis. When  their surgery is performed  we will be monitoring the patient with sliding scale insulin and accuchecks.  Based on those values we will determine whether the patient needs a reduction of those medications postoperatively or total removal of those medications on discharge.  We will have the patient continue accuchecks postoperatively while at home also and report to me or their family physician for appropriate adjustments as needed.  The patient also understands that in the event of uncontrolled blood sugar preoperatively that we may choose to postpone their surgery. 
  
Hypertension - The patient has a clear understanding of how weight loss improves hypertension as a whole, but also they understand that there is a significant genetic component to this disease process.  We will monitor the patients blood pressure while in the hospital and the plan would be to continue those medications postoperatively.  If a diuretic is being used we will stop them on discharge to prevent dehydration particularly with the sleeve gastrectomy and the gastric bypass procedures.  They will be instructed to monitor their blood pressure postoperatively while at home and notify their primary care physician in the event of any significantly high or uncharacteristic readings. 
  
GERD -The patient understands that weight loss surgery is not a guaranteed cure for reflux disease but does understand the benefits that weight loss can have on reflux disease. They also understand that at the time of surgery the gastroesophageal junction will be evaluated for the presence of a diaphragmatic hernia. Hernias will be corrected always with the gastric band and sleeve gastrectomy procedures, but only on a case by case basis with the gastric bypass. The patient also understands that neither weight loss surgery nor repair of a diaphragmatic hernia repair guarantees the complete cessation of the disease.  
  
Weight Related Arthritis -The patient understands the benefits that weight loss surgery can have on their arthritis but also understands that weight loss is not a guaranteed cure and relief of symptoms is often dependent on the severity of the underlying disease. The patient also understands that traditional pharmaceutical treatments for this diagnosis are usually unavailable to post-operative weight loss patients due to the effects on the gastrointestinal tract. Any changes to the patients medication treatment will ultimately be made the patients PCP with input by our office. 
  
Restrictive Airway Disease - We will continue all of their pulmonary medications in the form of oral pills and inhalers in both the perioperative and postoperative period. They understand that their symptoms should improve with weight loss. Any further testing related to this will be turned over to their family physician or pulmonologist. 
 
Signed By: Shelbie Romero MD   
 October 21, 2019

## 2019-10-21 NOTE — PROGRESS NOTES
Patient attended pre-operative education class. Appears to have a good understanding of the diet progression, food choices, and dietary/exercise habits for successful weight loss and nourishment after surgery. The class material included: post-op diet progression, including fluid needs, appropriate liquid choices, protein supplements, and behavior modifications recommended post-operatively. Patient previously received education booklet, during today's class the contents and appropriate sections were reviewed. Utilized education check points, class discussion, and teach back method to reinforce/ review appropriate dietary and exercise habits for optimal weight loss following surgery. Provided food list, example diet, and resources from class. Patient has contact information for any further questions or concerns. Will continue following post surgery for diet advancement.     Signed By: Moses Valdez     October 21, 2019

## 2019-10-21 NOTE — PROGRESS NOTES
Gastric Bypass - History and Physical    Subjective: The patient is a 75 y.o. obese female with a Body mass index is 48.23 kg/m². .   she presents now to review their work up to date to see if they are a candidate for surgery and whether or not to proceed with the previously requested procedure. Bariatric comorbidities continue to include:   Patient Active Problem List   Diagnosis Code    Obesity, morbid ([facility] E66.01    Hypertension I10    Hyperlipidemia E78.5    Asthma J45.909    Diabetes ([facility] E11.9    GERD (gastroesophageal reflux disease) K21.9    Morbid obesity with BMI of 45.0-49.9, adult ([facility] E66.01, Z68.42    Hypothyroid E03.9    Low back pain M54.5    Arthritis of knee M17.10       They have been generally well prior to this visit and have had no recent significant illnesses. The patient has had no gastrointestinal issues that would preclude them from proceeding with the surgery they have chosen. Kevin Soria has recently tried a preoperative weight loss program  in addition to seeing a bariatric nutritionist preoperatively. We have discussed on at least one other occasion about the various types of surgical weight loss procedures and they have considered these options after our initial consultation. We have once again discussed these procedures in detail and they have now decided on a surgical procedure. They present today to discuss this and confirm that their evaluation pre operatively is acceptable to continue with surgery. The patient desires laparoscopic gastric bypass surgery for surgical weight loss. The patients goal weight is 165 lb. These goals are consistent with expected outcomes of their desired operation. her Medical goals are resolution of these health issues. Since the patient's original consult 6 months ago they have been seen by their PCP for routine medical care.   There has been no change to their overall medical or surgical history and they have been on no steroids in any form.     She has gained 1 lbs over the past 6 months. Has been off metformin for 1 month and blood sugars running around 80. Saw Dr. Suzie Weller for cardiac clearance. Note scanned into media tab. TTE showed EF 60-65% with mild CLVH. Negative ischemia on perfusion study.       Patient Active Problem List    Diagnosis Date Noted    Morbid obesity with BMI of 45.0-49.9, adult ([facility] 04/15/2019    Hypertension     Hyperlipidemia     Asthma     Diabetes ([facility]     GERD (gastroesophageal reflux disease)     Hypothyroid     Low back pain     Arthritis of knee     Obesity, morbid ([facility] 07/26/2018      Past Surgical History:   Procedure Laterality Date    ABDOMEN SURGERY PROC UNLISTED      abdominoplasty    HX HYSTERECTOMY      HX ORTHOPAEDIC Right     Sx due to broken ankle- screw placed    HX OTHER SURGICAL  01/28/2019    Pituitary tumor resection, Dr. Urvashi Muniz      pituitary surgery for adenoma      Social History     Tobacco Use    Smoking status: Never Smoker    Smokeless tobacco: Never Used   Substance Use Topics    Alcohol use: No      Family History   Problem Relation Age of Onset    Pancreatic Cancer Mother     Diabetes Father     Thyroid Disease Maternal Grandmother     Other Maternal Grandmother         pituitary tumor      Current Outpatient Medications   Medication Sig Dispense Refill    dextroamphetamine-amphetamine (ADDERALL) 30 mg tablet dextroamphetamine-amphetamine 30 mg tablet   TK 1 T PO BID      DULoxetine (CYMBALTA) 20 mg capsule Take 20 mg by mouth daily.  levothyroxine (SYNTHROID) 75 mcg tablet Take  by mouth Daily (before breakfast).  VENTOLIN HFA 90 mcg/actuation inhaler INHALE 2 PUFFS BY MOUTH EVERY 6 HOURS  1    cetirizine (ZYRTEC) 10 mg tablet Take 10 mg by mouth daily as needed. 6    hydroCHLOROthiazide (HYDRODIURIL) 25 mg tablet Take 25 mg by mouth daily.       omeprazole (PRILOSEC) 20 mg capsule Take 1 Cap by mouth daily. 30 Cap 2    oxyCODONE-acetaminophen (PERCOCET) 5-325 mg per tablet Take 1 Tab by mouth every four (4) hours as needed for Pain for up to 3 days. Max Daily Amount: 6 Tabs. 30 Tab 0    enoxaparin (LOVENOX) 40 mg/0.4 mL 0.4 mL by SubCUTAneous route every twelve (12) hours every twelve (12) hours for 7 days. Indications: Deep Vein Thrombosis Prevention 14 Syringe 0    aspirin delayed-release 81 mg tablet Take 81 mg by mouth.        No Known Allergies       Review of Systems:          General - No history or complaints of unexpected fever, chills, or weight loss  Head/Neck - No history or complaints of headache, diplopia, dysphagia, hearing loss  Cardiac - No history or complaints of chest pain, palpitations, murmur, or shortness of breath  Pulmonary - No history or complaints of shortness of breath, productive cough, hemoptysis  Gastrointestinal - has reflux controlled with PPI,no  abdominal pain, obstipation/constipation or blood per rectum  Genitourinary - No history or complaints of hematuria/dysuria, stress urinary incontinence symptoms, or renal lithiasis  Musculoskeletal - has joint pain in their knees and low back,  no muscular weakness  Hematologic - No history or complaints of bleeding disorders,  No blood transfusions  Neurologic - No history or complaints of  migraine headaches, seizure activity, syncopal episodes, TIA or stroke  Integumentary - No history or complaints of rashes, abnormal nevi, skin cancer  Gynecological - No abnormal bleeding, has recurrent UTIs from diabetic medications             Objective:     Visit Vitals  /79 (BP 1 Location: Left arm, BP Patient Position: Sitting)   Pulse 82   Temp 98.6 °F (37 °C)   Ht 5' 4\" (1.626 m)   Wt 127.5 kg (281 lb)   SpO2 100%   BMI 48.23 kg/m²       Physical Examination: General appearance - alert, well appearing, and in no distress and oriented to person, place, and time  Mental status - alert, oriented to person, place, and time, normal mood, behavior, speech, dress, motor activity, and thought processes  Eyes - pupils equal and reactive, extraocular eye movements intact, sclera anicteric, left eye normal, right eye normal  Ears - right ear normal, left ear normal  Nose - normal and patent, no erythema, discharge or polyps  Mouth - mucous membranes moist, pharynx normal without lesions  Neck - supple, no significant adenopathy  Lymphatics - no palpable lymphadenopathy, no hepatosplenomegaly  Chest - clear to auscultation, no wheezes, rales or rhonchi, symmetric air entry  Heart - normal rate, regular rhythm, normal S1, S2, no murmurs, rubs, clicks or gallops  Abdomen - soft, nontender, nondistended, no masses or organomegaly  Back exam - full range of motion, no tenderness, palpable spasm or pain on motion  Neurological - alert, oriented, normal speech, no focal findings or movement disorder noted  Musculoskeletal - no joint tenderness, deformity or swelling  Extremities - peripheral pulses normal, no pedal edema, no clubbing or cyanosis  Skin - normal coloration and turgor, no rashes, no suspicious skin lesions noted    Labs :     Lab Results   Component Value Date/Time    WBC 5.3 05/06/2019 04:27 PM    HGB 12.1 05/06/2019 04:27 PM    HCT 38.3 05/06/2019 04:27 PM    PLATELET 454 15/93/4140 04:27 PM    MCV 81.1 05/06/2019 04:27 PM     Lab Results   Component Value Date/Time    Sodium 140 05/06/2019 04:27 PM    Potassium 4.0 05/06/2019 04:27 PM    Chloride 109 (H) 05/06/2019 04:27 PM    CO2 25 05/06/2019 04:27 PM    Anion gap 6 05/06/2019 04:27 PM    Glucose 97 05/06/2019 04:27 PM    BUN 16 05/06/2019 04:27 PM    Creatinine 0.82 05/06/2019 04:27 PM    BUN/Creatinine ratio 20 05/06/2019 04:27 PM    GFR est AA >60 05/06/2019 04:27 PM    GFR est non-AA >60 05/06/2019 04:27 PM    Calcium 9.4 05/06/2019 04:27 PM    Bilirubin, total 0.3 05/06/2019 04:27 PM    AST (SGOT) 11 (L) 05/06/2019 04:27 PM    Alk.  phosphatase 115 05/06/2019 04:27 PM    Protein, total 6.8 05/06/2019 04:27 PM    Albumin 3.3 (L) 05/06/2019 04:27 PM    Globulin 3.5 05/06/2019 04:27 PM    A-G Ratio 0.9 05/06/2019 04:27 PM    ALT (SGPT) 19 05/06/2019 04:27 PM     No results found for: IRON, FE, TIBC, IBCT, PSAT, FERR  No results found for: FOL, RBCF  No results found for: VITD3, Ching Schaumann, XQVID, VD3RIA            Cardiac / Pulmonary Evaluation:     Negative perfusion study  EF 60-65%    UGI Results: The patient was brought to the fluoroscopy unit and was given thin barium. On swallowing of barium, they were noted to have  normal peristalsis of their esophagus. They had prompt filling of distal esophagus with tapering into the gastroesophageal junction. There was no evidence of a hiatal hernia present. Contrast then filled the gastric cardia, fundus,body and pre pyloric region with no abnormalities noted. Contrast then exited the pylorus in normal fashion. No obstruction was noted. There was evidence of reflux noted.     (severe reflux with normal anatomy)    Assessment:     Morbid obesity with associated comorbidity    Plan:     laparoscopic gastric bypass surgery    This is a 75 y.o. female with a BMI of Body mass index is 48.23 kg/m². and the weight-related co-morbidties as noted above. Carlos Manuel meets the NIH criteria for bariatric surgery based upon the BMI of Body mass index is 48.23 kg/m². and multiple weight-related co-morbidties. Carlos Manuel has elected laparoscopic gastric bypass as her intervention of choice for treatment of morbid obestiy through surgical means secondary to its uniform results,  profound baseline suppression of hunger and pace at which weight is lost.    In the office today, following Carlos Manuel's history and physical examination, a 40 minute discussion regarding the anatomic alterations for the laparoscopic gastric bypass  was undertaken.  The dietary expectations and the patient  dependent factors for success were thoroughly discussed, to include the need for interval follow-up and long-term dietary changes associated with success. The possible short and long term  complications of the gastric bypass were also discussed, to include but not limited to;death, DVT/PE, staple line leak, bleeding, stricture formation, infection,internal hernia  and pouch dilation. Specific weight related outcomes for success were also discussed with an emphasis on careful and close follow-up with the first year and Dietary behavior modification over the first years as baseline cyclical hunger returns  The patient expressed an understanding of the above factors, and her questions were answered in their entirety. In addition, the patient attended a 1.5 hour power point seminar regarding obesity, surgical weight loss including, adjustable gastric band, gastric bypass, and sleeve gastrectomy. This discussion contrasted the different surgical techniques, mechanisms of actions and expected outcomes, and surgical and medical risks associated with each procedure. During this seminar, there was a long question and answer session where each questions was answered until there were no additional questions. Today, the patient had all of her questions answered and the decision was made today that the patient's preoperative evaluation is acceptable for them  to proceed with bariatric surgery  choosing  gastric bypass as her surgical option. Secondary Diagnoses:     DVT / Pulmonary Embolus Risk - The patient is at a higher risk for post operative DVT / pulmonary embolus secondary to their morbid obese status, relative sedentary lifestyle, and impending general anesthetic. We will plan to use anticoagulation therapy pre and post operative as well as compression leg devices and encourage ambulation once on the hospital nursing floor.   The need for possible at home anticoagulation therapy has also been discussed and any decision on this matter will be made during post operative evaluations. Signs and symptoms of DVT / PE have been discussed with the patient and they have been instructed to call the office if any these occur in the \"at home\" post op phase. Adult Onset Diabetes - The patient has herbie given a very low carbohydrate diet preoperatively along with instructions to monitor their blood sugars on a regular daily basis. When  their surgery is performed  we will be monitoring the patient with sliding scale insulin and accuchecks.  Based on those values we will determine whether the patient needs a reduction of those medications postoperatively or total removal of those medications on discharge.  We will have the patient continue accuchecks postoperatively while at home also and report to me or their family physician for appropriate adjustments as needed.  The patient also understands that in the event of uncontrolled blood sugar preoperatively that we may choose to postpone their surgery.     Hypertension - The patient has a clear understanding of how weight loss improves hypertension as a whole, but also they understand that there is a significant genetic component to this disease process. We will monitor the patients blood pressure while in the hospital and the plan would be to continue those medications postoperatively.  If a diuretic is being used we will stop them on discharge to prevent dehydration particularly with the sleeve gastrectomy and the gastric bypass procedures.  They will be instructed to monitor their blood pressure postoperatively while at home and notify their primary care physician in the event of any significantly high or uncharacteristic readings.     GERD -The patient understands that weight loss surgery is not a guaranteed cure for reflux disease but does understand the benefits that weight loss can have on reflux disease.   They also understand that at the time of surgery the gastroesophageal junction will be evaluated for the presence of a diaphragmatic hernia. Hernias will be corrected always with the gastric band and sleeve gastrectomy procedures, but only on a case by case basis with the gastric bypass. The patient also understands that neither weight loss surgery nor repair of a diaphragmatic hernia repair guarantees the complete cessation of the disease.      Weight Related Arthritis -The patient understands the benefits that weight loss surgery can have on their arthritis but also understands that weight loss is not a guaranteed cure and relief of symptoms is often dependent on the severity of the underlying disease. The patient also understands that traditional pharmaceutical treatments for this diagnosis are usually unavailable to post-operative weight loss patients due to the effects on the gastrointestinal tract. Any changes to the patients medication treatment will ultimately be made the patients PCP with input by our office.     Restrictive Airway Disease - We will continue all of their pulmonary medications in the form of oral pills and inhalers in both the perioperative and postoperative period. They understand that their symptoms should improve with weight loss.  Any further testing related to this will be turned over to their family physician or pulmonologist.    Signed By: Memo Dasilva MD     October 21, 2019

## 2019-10-21 NOTE — PROGRESS NOTES
Patient attended bariatric surgery pre-operative education class today. Patient appeared to have an understanding of the surgical procedure, pre-op and post-op risks, and lifestyle changes. Patient asked appropriate questions, and all questions were answered in their entirety. This RN read Patient Contract for Bariatric Surgery to patient, as they read along using their own copy, and patient initialed, signed, and dated contract during this time.

## 2019-10-23 ENCOUNTER — TELEPHONE (OUTPATIENT)
Dept: SURGERY | Age: 64
End: 2019-10-23

## 2019-10-23 NOTE — TELEPHONE ENCOUNTER
Patient contacted this RN, as she did not receive a skin prep kit during her pre-op visit. RN referred her to admissions for the kit.

## 2019-10-24 ENCOUNTER — HOSPITAL ENCOUNTER (OUTPATIENT)
Dept: PREADMISSION TESTING | Age: 64
Discharge: HOME | End: 2019-10-24
Payer: MEDICARE

## 2019-10-24 DIAGNOSIS — E11.9 TYPE 2 DIABETES MELLITUS WITHOUT COMPLICATION, UNSPECIFIED WHETHER LONG TERM INSULIN USE (HCC): ICD-10-CM

## 2019-10-24 DIAGNOSIS — I10 HYPERTENSION, UNSPECIFIED TYPE: ICD-10-CM

## 2019-10-24 DIAGNOSIS — K21.9 GASTROESOPHAGEAL REFLUX DISEASE WITHOUT ESOPHAGITIS: ICD-10-CM

## 2019-10-24 DIAGNOSIS — Z01.812 BLOOD TESTS PRIOR TO TREATMENT OR PROCEDURE: ICD-10-CM

## 2019-10-24 DIAGNOSIS — E66.01 OBESITY, MORBID (HCC): ICD-10-CM

## 2019-10-24 LAB
ABO + RH BLD: NORMAL
ALBUMIN SERPL-MCNC: 3.3 G/DL (ref 3.4–5)
ALBUMIN/GLOB SERPL: 0.8 {RATIO} (ref 0.8–1.7)
ALP SERPL-CCNC: 157 U/L (ref 45–117)
ALT SERPL-CCNC: 23 U/L (ref 13–56)
ANION GAP SERPL CALC-SCNC: 5 MMOL/L (ref 3–18)
AST SERPL-CCNC: 12 U/L (ref 10–38)
BASOPHILS # BLD: 0 K/UL (ref 0–0.1)
BASOPHILS NFR BLD: 1 % (ref 0–2)
BILIRUB SERPL-MCNC: 0.3 MG/DL (ref 0.2–1)
BLOOD GROUP ANTIBODIES SERPL: NORMAL
BUN SERPL-MCNC: 14 MG/DL (ref 7–18)
BUN/CREAT SERPL: 15 (ref 12–20)
CALCIUM SERPL-MCNC: 9.5 MG/DL (ref 8.5–10.1)
CHLORIDE SERPL-SCNC: 103 MMOL/L (ref 100–111)
CO2 SERPL-SCNC: 30 MMOL/L (ref 21–32)
CREAT SERPL-MCNC: 0.93 MG/DL (ref 0.6–1.3)
DIFFERENTIAL METHOD BLD: NORMAL
EOSINOPHIL # BLD: 0.1 K/UL (ref 0–0.4)
EOSINOPHIL NFR BLD: 1 % (ref 0–5)
ERYTHROCYTE [DISTWIDTH] IN BLOOD BY AUTOMATED COUNT: 14.4 % (ref 11.6–14.5)
EST. AVERAGE GLUCOSE BLD GHB EST-MCNC: 131 MG/DL
GLOBULIN SER CALC-MCNC: 4 G/DL (ref 2–4)
GLUCOSE SERPL-MCNC: 105 MG/DL (ref 74–99)
HBA1C MFR BLD: 6.2 % (ref 4.2–5.6)
HCT VFR BLD AUTO: 42.7 % (ref 35–45)
HGB BLD-MCNC: 13.4 G/DL (ref 12–16)
LYMPHOCYTES # BLD: 2.7 K/UL (ref 0.9–3.6)
LYMPHOCYTES NFR BLD: 42 % (ref 21–52)
MCH RBC QN AUTO: 25.7 PG (ref 24–34)
MCHC RBC AUTO-ENTMCNC: 31.4 G/DL (ref 31–37)
MCV RBC AUTO: 82 FL (ref 74–97)
MONOCYTES # BLD: 0.5 K/UL (ref 0.05–1.2)
MONOCYTES NFR BLD: 7 % (ref 3–10)
NEUTS SEG # BLD: 3.2 K/UL (ref 1.8–8)
NEUTS SEG NFR BLD: 49 % (ref 40–73)
PLATELET # BLD AUTO: 414 K/UL (ref 135–420)
PMV BLD AUTO: 10.1 FL (ref 9.2–11.8)
POTASSIUM SERPL-SCNC: 4.4 MMOL/L (ref 3.5–5.5)
PROT SERPL-MCNC: 7.3 G/DL (ref 6.4–8.2)
RBC # BLD AUTO: 5.21 M/UL (ref 4.2–5.3)
SODIUM SERPL-SCNC: 138 MMOL/L (ref 136–145)
SPECIMEN EXP DATE BLD: NORMAL
WBC # BLD AUTO: 6.5 K/UL (ref 4.6–13.2)

## 2019-10-24 PROCEDURE — 86900 BLOOD TYPING SEROLOGIC ABO: CPT

## 2019-10-24 PROCEDURE — 85025 COMPLETE CBC W/AUTO DIFF WBC: CPT

## 2019-10-24 PROCEDURE — 80053 COMPREHEN METABOLIC PANEL: CPT

## 2019-10-24 PROCEDURE — 83036 HEMOGLOBIN GLYCOSYLATED A1C: CPT

## 2019-10-24 PROCEDURE — 93005 ELECTROCARDIOGRAM TRACING: CPT

## 2019-10-24 PROCEDURE — 36415 COLL VENOUS BLD VENIPUNCTURE: CPT

## 2019-10-25 LAB
ATRIAL RATE: 76 BPM
CALCULATED P AXIS, ECG09: 52 DEGREES
CALCULATED R AXIS, ECG10: 40 DEGREES
CALCULATED T AXIS, ECG11: 9 DEGREES
DIAGNOSIS, 93000: NORMAL
P-R INTERVAL, ECG05: 156 MS
Q-T INTERVAL, ECG07: 344 MS
QRS DURATION, ECG06: 72 MS
QTC CALCULATION (BEZET), ECG08: 387 MS
VENTRICULAR RATE, ECG03: 76 BPM

## 2019-10-28 ENCOUNTER — TELEPHONE (OUTPATIENT)
Dept: SURGERY | Age: 64
End: 2019-10-28

## 2019-10-28 ENCOUNTER — ANESTHESIA EVENT (OUTPATIENT)
Dept: SURGERY | Age: 64
DRG: 621 | End: 2019-10-28
Payer: MEDICARE

## 2019-10-28 NOTE — TELEPHONE ENCOUNTER
Patient contacted this RN to ask if she needed to take her BP medication in the morning. RN confirmed with Dr. Louie Gresham she did not need to take it. Patient verbalized understanding. She stated she was going to the buffet tonight for her \"last supper. \"  RN asked if she had done clear liquids today, and she stated \"yes,\" but that she thought she is supposed to have clear liquids after midnight. RN reminded her it was clear liquids the day before and to not have anything by mouth after midnight, with the exception of a sip of water to take any medications the morning of surgery. She verbalized understanding.

## 2019-10-29 ENCOUNTER — HOSPITAL ENCOUNTER (INPATIENT)
Age: 64
LOS: 1 days | Discharge: HOME | DRG: 621 | End: 2019-10-30
Attending: SPECIALIST | Admitting: SPECIALIST
Payer: MEDICARE

## 2019-10-29 ENCOUNTER — ANESTHESIA (OUTPATIENT)
Dept: SURGERY | Age: 64
DRG: 621 | End: 2019-10-29
Payer: MEDICARE

## 2019-10-29 ENCOUNTER — NURSE NAVIGATOR (OUTPATIENT)
Dept: SURGERY | Age: 64
End: 2019-10-29

## 2019-10-29 PROBLEM — E66.01 MORBID OBESITY WITH BODY MASS INDEX (BMI) OF 40.0 TO 49.9 (HCC): Status: ACTIVE | Noted: 2019-10-29

## 2019-10-29 LAB
GLUCOSE BLD STRIP.AUTO-MCNC: 115 MG/DL (ref 70–110)
GLUCOSE BLD STRIP.AUTO-MCNC: 133 MG/DL (ref 70–110)
GLUCOSE BLD STRIP.AUTO-MCNC: 136 MG/DL (ref 70–110)
GLUCOSE BLD STRIP.AUTO-MCNC: 138 MG/DL (ref 70–110)
GLUCOSE BLD STRIP.AUTO-MCNC: 98 MG/DL (ref 70–110)

## 2019-10-29 PROCEDURE — 65270000029 HC RM PRIVATE

## 2019-10-29 PROCEDURE — 77030009426 HC FCPS BIOP ENDOSC BSC -B: Performed by: SPECIALIST

## 2019-10-29 PROCEDURE — 77030008574 HC TBNG SUC IRR STRY -B: Performed by: SPECIALIST

## 2019-10-29 PROCEDURE — 77030020407 HC IV BLD WRMR ST 3M -A: Performed by: ANESTHESIOLOGY

## 2019-10-29 PROCEDURE — 77030036732 HC RELD STPLR VASC J&J -F: Performed by: SPECIALIST

## 2019-10-29 PROCEDURE — 74011250636 HC RX REV CODE- 250/636: Performed by: SPECIALIST

## 2019-10-29 PROCEDURE — 76210000006 HC OR PH I REC 0.5 TO 1 HR: Performed by: SPECIALIST

## 2019-10-29 PROCEDURE — 76010000131 HC OR TIME 2 TO 2.5 HR: Performed by: SPECIALIST

## 2019-10-29 PROCEDURE — 77030014008 HC SPNG HEMSTAT J&J -C: Performed by: SPECIALIST

## 2019-10-29 PROCEDURE — 77030009967 HC RELD STPLR ENDOSC J&J -C: Performed by: SPECIALIST

## 2019-10-29 PROCEDURE — 74011000250 HC RX REV CODE- 250: Performed by: SPECIALIST

## 2019-10-29 PROCEDURE — 77030020782 HC GWN BAIR PAWS FLX 3M -B: Performed by: SPECIALIST

## 2019-10-29 PROCEDURE — 77030013079 HC BLNKT BAIR HGGR 3M -A: Performed by: ANESTHESIOLOGY

## 2019-10-29 PROCEDURE — 0DB68ZX EXCISION OF STOMACH, VIA NATURAL OR ARTIFICIAL OPENING ENDOSCOPIC, DIAGNOSTIC: ICD-10-PCS | Performed by: SPECIALIST

## 2019-10-29 PROCEDURE — 77030003580 HC NDL INSUF VERES J&J -B: Performed by: SPECIALIST

## 2019-10-29 PROCEDURE — 77030002986 HC SUT PROL J&J -A: Performed by: SPECIALIST

## 2019-10-29 PROCEDURE — 77030002896 HC SUT CLP ABSRB J&J -B: Performed by: SPECIALIST

## 2019-10-29 PROCEDURE — 74011250637 HC RX REV CODE- 250/637: Performed by: SPECIALIST

## 2019-10-29 PROCEDURE — 77030002933 HC SUT MCRYL J&J -A: Performed by: SPECIALIST

## 2019-10-29 PROCEDURE — 77030009968 HC RELD STPLR ENDOSC J&J -D: Performed by: SPECIALIST

## 2019-10-29 PROCEDURE — 77030009851 HC PCH RTVR ENDOSC AMR -B: Performed by: SPECIALIST

## 2019-10-29 PROCEDURE — 88342 IMHCHEM/IMCYTCHM 1ST ANTB: CPT

## 2019-10-29 PROCEDURE — 77030010030: Performed by: SPECIALIST

## 2019-10-29 PROCEDURE — 77030016151 HC PROTCTR LNS DFOG COVD -B: Performed by: SPECIALIST

## 2019-10-29 PROCEDURE — 77030008683 HC TU ET CUF COVD -A: Performed by: ANESTHESIOLOGY

## 2019-10-29 PROCEDURE — 77030022585 HC SEAL FBRN EVICEL J&J -F: Performed by: SPECIALIST

## 2019-10-29 PROCEDURE — 77030012407 HC DRN WND BARD -B: Performed by: SPECIALIST

## 2019-10-29 PROCEDURE — 77030008603 HC TRCR ENDOSC EPATH J&J -C: Performed by: SPECIALIST

## 2019-10-29 PROCEDURE — 77030002912 HC SUT ETHBND J&J -A: Performed by: SPECIALIST

## 2019-10-29 PROCEDURE — 77030005264 HC CATH JEJU BKR BARD -D: Performed by: SPECIALIST

## 2019-10-29 PROCEDURE — 77030040361 HC SLV COMPR DVT MDII -B: Performed by: SPECIALIST

## 2019-10-29 PROCEDURE — 77030034850: Performed by: SPECIALIST

## 2019-10-29 PROCEDURE — 82962 GLUCOSE BLOOD TEST: CPT

## 2019-10-29 PROCEDURE — 77030008602 HC TRCR ENDOSC EPATH J&J -B: Performed by: SPECIALIST

## 2019-10-29 PROCEDURE — 74011250636 HC RX REV CODE- 250/636: Performed by: NURSE ANESTHETIST, CERTIFIED REGISTERED

## 2019-10-29 PROCEDURE — 88313 SPECIAL STAINS GROUP 2: CPT

## 2019-10-29 PROCEDURE — 77030034154 HC SHR COAG HARM ACE J&J -F: Performed by: SPECIALIST

## 2019-10-29 PROCEDURE — 77030002916 HC SUT ETHLN J&J -A: Performed by: SPECIALIST

## 2019-10-29 PROCEDURE — 77030008477 HC STYL SATN SLP COVD -A: Performed by: ANESTHESIOLOGY

## 2019-10-29 PROCEDURE — 77030020255 HC SOL INJ LR 1000ML BG: Performed by: SPECIALIST

## 2019-10-29 PROCEDURE — 77030011810 HC STPLR ENDOSC J&J -G: Performed by: SPECIALIST

## 2019-10-29 PROCEDURE — 77030002966 HC SUT PDS J&J -A: Performed by: SPECIALIST

## 2019-10-29 PROCEDURE — 76060000035 HC ANESTHESIA 2 TO 2.5 HR: Performed by: SPECIALIST

## 2019-10-29 PROCEDURE — 0D164ZA BYPASS STOMACH TO JEJUNUM, PERCUTANEOUS ENDOSCOPIC APPROACH: ICD-10-PCS | Performed by: SPECIALIST

## 2019-10-29 PROCEDURE — 77030006643: Performed by: ANESTHESIOLOGY

## 2019-10-29 PROCEDURE — 77030010515 HC APPL ENDOCLP LIG J&J -B: Performed by: SPECIALIST

## 2019-10-29 PROCEDURE — 77030027876 HC STPLR ENDOSC FLX PWR J&J -G1: Performed by: SPECIALIST

## 2019-10-29 PROCEDURE — 88307 TISSUE EXAM BY PATHOLOGIST: CPT

## 2019-10-29 PROCEDURE — 88305 TISSUE EXAM BY PATHOLOGIST: CPT

## 2019-10-29 PROCEDURE — 0FB04ZX EXCISION OF LIVER, PERCUTANEOUS ENDOSCOPIC APPROACH, DIAGNOSTIC: ICD-10-PCS | Performed by: SPECIALIST

## 2019-10-29 PROCEDURE — 77010033678 HC OXYGEN DAILY

## 2019-10-29 PROCEDURE — 74011000272 HC RX REV CODE- 272: Performed by: SPECIALIST

## 2019-10-29 PROCEDURE — 77030008518 HC TBNG INSUF ENDO STRY -B: Performed by: SPECIALIST

## 2019-10-29 PROCEDURE — 77030018836 HC SOL IRR NACL ICUM -A: Performed by: SPECIALIST

## 2019-10-29 PROCEDURE — 74011000250 HC RX REV CODE- 250: Performed by: NURSE ANESTHETIST, CERTIFIED REGISTERED

## 2019-10-29 PROCEDURE — 74011250637 HC RX REV CODE- 250/637: Performed by: NURSE ANESTHETIST, CERTIFIED REGISTERED

## 2019-10-29 RX ORDER — SODIUM CHLORIDE, SODIUM LACTATE, POTASSIUM CHLORIDE, CALCIUM CHLORIDE 600; 310; 30; 20 MG/100ML; MG/100ML; MG/100ML; MG/100ML
1000 INJECTION, SOLUTION INTRAVENOUS CONTINUOUS
Status: DISCONTINUED | OUTPATIENT
Start: 2019-10-29 | End: 2019-10-29 | Stop reason: HOSPADM

## 2019-10-29 RX ORDER — ALBUTEROL SULFATE 0.83 MG/ML
2.5 SOLUTION RESPIRATORY (INHALATION) AS NEEDED
Status: DISCONTINUED | OUTPATIENT
Start: 2019-10-29 | End: 2019-10-29 | Stop reason: HOSPADM

## 2019-10-29 RX ORDER — PHENYLEPHRINE HCL IN 0.9% NACL 1 MG/10 ML
SYRINGE (ML) INTRAVENOUS AS NEEDED
Status: DISCONTINUED | OUTPATIENT
Start: 2019-10-29 | End: 2019-10-29 | Stop reason: HOSPADM

## 2019-10-29 RX ORDER — FAMOTIDINE 10 MG/ML
20 INJECTION INTRAVENOUS
Status: COMPLETED | OUTPATIENT
Start: 2019-10-29 | End: 2019-10-29

## 2019-10-29 RX ORDER — INSULIN LISPRO 100 [IU]/ML
INJECTION, SOLUTION INTRAVENOUS; SUBCUTANEOUS ONCE
Status: DISCONTINUED | OUTPATIENT
Start: 2019-10-29 | End: 2019-10-29 | Stop reason: HOSPADM

## 2019-10-29 RX ORDER — SODIUM CHLORIDE, SODIUM LACTATE, POTASSIUM CHLORIDE, CALCIUM CHLORIDE 600; 310; 30; 20 MG/100ML; MG/100ML; MG/100ML; MG/100ML
125 INJECTION, SOLUTION INTRAVENOUS CONTINUOUS
Status: DISCONTINUED | OUTPATIENT
Start: 2019-10-29 | End: 2019-10-29

## 2019-10-29 RX ORDER — NALOXONE HYDROCHLORIDE 0.4 MG/ML
0.4 INJECTION, SOLUTION INTRAMUSCULAR; INTRAVENOUS; SUBCUTANEOUS AS NEEDED
Status: DISCONTINUED | OUTPATIENT
Start: 2019-10-29 | End: 2019-10-30 | Stop reason: HOSPADM

## 2019-10-29 RX ORDER — METOCLOPRAMIDE HYDROCHLORIDE 5 MG/ML
10 INJECTION INTRAMUSCULAR; INTRAVENOUS EVERY 6 HOURS
Status: DISCONTINUED | OUTPATIENT
Start: 2019-10-29 | End: 2019-10-30 | Stop reason: HOSPADM

## 2019-10-29 RX ORDER — CEFAZOLIN SODIUM/WATER 2 G/20 ML
2 SYRINGE (ML) INTRAVENOUS EVERY 8 HOURS
Status: DISCONTINUED | OUTPATIENT
Start: 2019-10-29 | End: 2019-10-29 | Stop reason: DRUGHIGH

## 2019-10-29 RX ORDER — ALBUTEROL SULFATE 90 UG/1
2 AEROSOL, METERED RESPIRATORY (INHALATION)
Status: DISCONTINUED | OUTPATIENT
Start: 2019-10-29 | End: 2019-10-30 | Stop reason: HOSPADM

## 2019-10-29 RX ORDER — ALBUTEROL SULFATE 90 UG/1
AEROSOL, METERED RESPIRATORY (INHALATION) AS NEEDED
Status: DISCONTINUED | OUTPATIENT
Start: 2019-10-29 | End: 2019-10-29 | Stop reason: HOSPADM

## 2019-10-29 RX ORDER — DIPHENHYDRAMINE HYDROCHLORIDE 50 MG/ML
12.5 INJECTION, SOLUTION INTRAMUSCULAR; INTRAVENOUS
Status: DISCONTINUED | OUTPATIENT
Start: 2019-10-29 | End: 2019-10-29 | Stop reason: HOSPADM

## 2019-10-29 RX ORDER — ACETAMINOPHEN 10 MG/ML
1000 INJECTION, SOLUTION INTRAVENOUS ONCE
Status: COMPLETED | OUTPATIENT
Start: 2019-10-29 | End: 2019-10-29

## 2019-10-29 RX ORDER — INSULIN LISPRO 100 [IU]/ML
INJECTION, SOLUTION INTRAVENOUS; SUBCUTANEOUS ONCE
Status: DISCONTINUED | OUTPATIENT
Start: 2019-10-29 | End: 2019-10-29 | Stop reason: SDUPTHER

## 2019-10-29 RX ORDER — FENTANYL CITRATE 50 UG/ML
25 INJECTION, SOLUTION INTRAMUSCULAR; INTRAVENOUS AS NEEDED
Status: DISCONTINUED | OUTPATIENT
Start: 2019-10-29 | End: 2019-10-29 | Stop reason: HOSPADM

## 2019-10-29 RX ORDER — OXYCODONE AND ACETAMINOPHEN 5; 325 MG/1; MG/1
1 TABLET ORAL AS NEEDED
Status: DISCONTINUED | OUTPATIENT
Start: 2019-10-29 | End: 2019-10-29 | Stop reason: HOSPADM

## 2019-10-29 RX ORDER — NALOXONE HYDROCHLORIDE 0.4 MG/ML
0.2 INJECTION, SOLUTION INTRAMUSCULAR; INTRAVENOUS; SUBCUTANEOUS AS NEEDED
Status: DISCONTINUED | OUTPATIENT
Start: 2019-10-29 | End: 2019-10-29 | Stop reason: HOSPADM

## 2019-10-29 RX ORDER — QUETIAPINE FUMARATE 25 MG/1
25 TABLET, FILM COATED ORAL
COMMUNITY
End: 2019-11-14

## 2019-10-29 RX ORDER — NYSTATIN 100000 [USP'U]/ML
500000 SUSPENSION ORAL
Status: COMPLETED | OUTPATIENT
Start: 2019-10-29 | End: 2019-10-29

## 2019-10-29 RX ORDER — LIDOCAINE HYDROCHLORIDE 20 MG/ML
INJECTION, SOLUTION EPIDURAL; INFILTRATION; INTRACAUDAL; PERINEURAL AS NEEDED
Status: DISCONTINUED | OUTPATIENT
Start: 2019-10-29 | End: 2019-10-29 | Stop reason: HOSPADM

## 2019-10-29 RX ORDER — ACETAMINOPHEN 10 MG/ML
1000 INJECTION, SOLUTION INTRAVENOUS EVERY 6 HOURS
Status: COMPLETED | OUTPATIENT
Start: 2019-10-29 | End: 2019-10-30

## 2019-10-29 RX ORDER — FLUMAZENIL 0.1 MG/ML
0.2 INJECTION INTRAVENOUS
Status: DISCONTINUED | OUTPATIENT
Start: 2019-10-29 | End: 2019-10-29 | Stop reason: HOSPADM

## 2019-10-29 RX ORDER — KETAMINE HYDROCHLORIDE 10 MG/ML
INJECTION, SOLUTION INTRAMUSCULAR; INTRAVENOUS AS NEEDED
Status: DISCONTINUED | OUTPATIENT
Start: 2019-10-29 | End: 2019-10-29 | Stop reason: HOSPADM

## 2019-10-29 RX ORDER — MORPHINE SULFATE 10 MG/ML
6 INJECTION, SOLUTION INTRAMUSCULAR; INTRAVENOUS
Status: DISCONTINUED | OUTPATIENT
Start: 2019-10-29 | End: 2019-10-30

## 2019-10-29 RX ORDER — DIPHENHYDRAMINE HYDROCHLORIDE 50 MG/ML
25 INJECTION, SOLUTION INTRAMUSCULAR; INTRAVENOUS
Status: DISCONTINUED | OUTPATIENT
Start: 2019-10-29 | End: 2019-10-30 | Stop reason: HOSPADM

## 2019-10-29 RX ORDER — DEXTROSE MONOHYDRATE 100 MG/ML
125-250 INJECTION, SOLUTION INTRAVENOUS AS NEEDED
Status: DISCONTINUED | OUTPATIENT
Start: 2019-10-29 | End: 2019-10-29 | Stop reason: HOSPADM

## 2019-10-29 RX ORDER — ENOXAPARIN SODIUM 100 MG/ML
40 INJECTION SUBCUTANEOUS
Status: COMPLETED | OUTPATIENT
Start: 2019-10-29 | End: 2019-10-29

## 2019-10-29 RX ORDER — SODIUM CHLORIDE, SODIUM LACTATE, POTASSIUM CHLORIDE, CALCIUM CHLORIDE 600; 310; 30; 20 MG/100ML; MG/100ML; MG/100ML; MG/100ML
150 INJECTION, SOLUTION INTRAVENOUS CONTINUOUS
Status: DISCONTINUED | OUTPATIENT
Start: 2019-10-29 | End: 2019-10-30

## 2019-10-29 RX ORDER — HYDROMORPHONE HYDROCHLORIDE 2 MG/ML
0.5 INJECTION, SOLUTION INTRAMUSCULAR; INTRAVENOUS; SUBCUTANEOUS
Status: DISCONTINUED | OUTPATIENT
Start: 2019-10-29 | End: 2019-10-29

## 2019-10-29 RX ORDER — ONDANSETRON 2 MG/ML
INJECTION INTRAMUSCULAR; INTRAVENOUS AS NEEDED
Status: DISCONTINUED | OUTPATIENT
Start: 2019-10-29 | End: 2019-10-29 | Stop reason: HOSPADM

## 2019-10-29 RX ORDER — HYDROMORPHONE HYDROCHLORIDE 1 MG/ML
INJECTION, SOLUTION INTRAMUSCULAR; INTRAVENOUS; SUBCUTANEOUS AS NEEDED
Status: DISCONTINUED | OUTPATIENT
Start: 2019-10-29 | End: 2019-10-29 | Stop reason: HOSPADM

## 2019-10-29 RX ORDER — BUPIVACAINE HYDROCHLORIDE AND EPINEPHRINE 2.5; 5 MG/ML; UG/ML
INJECTION, SOLUTION EPIDURAL; INFILTRATION; INTRACAUDAL; PERINEURAL AS NEEDED
Status: DISCONTINUED | OUTPATIENT
Start: 2019-10-29 | End: 2019-10-29 | Stop reason: HOSPADM

## 2019-10-29 RX ORDER — MAGNESIUM SULFATE 100 %
4 CRYSTALS MISCELLANEOUS AS NEEDED
Status: DISCONTINUED | OUTPATIENT
Start: 2019-10-29 | End: 2019-10-29 | Stop reason: HOSPADM

## 2019-10-29 RX ORDER — DEXTROSE MONOHYDRATE 100 MG/ML
125-250 INJECTION, SOLUTION INTRAVENOUS AS NEEDED
Status: DISCONTINUED | OUTPATIENT
Start: 2019-10-29 | End: 2019-10-30 | Stop reason: HOSPADM

## 2019-10-29 RX ORDER — HYDROMORPHONE HYDROCHLORIDE 2 MG/ML
0.5 INJECTION, SOLUTION INTRAMUSCULAR; INTRAVENOUS; SUBCUTANEOUS
Status: DISCONTINUED | OUTPATIENT
Start: 2019-10-29 | End: 2019-10-29 | Stop reason: HOSPADM

## 2019-10-29 RX ORDER — MIDAZOLAM HYDROCHLORIDE 1 MG/ML
INJECTION, SOLUTION INTRAMUSCULAR; INTRAVENOUS AS NEEDED
Status: DISCONTINUED | OUTPATIENT
Start: 2019-10-29 | End: 2019-10-29 | Stop reason: HOSPADM

## 2019-10-29 RX ORDER — FENTANYL CITRATE 50 UG/ML
INJECTION, SOLUTION INTRAMUSCULAR; INTRAVENOUS AS NEEDED
Status: DISCONTINUED | OUTPATIENT
Start: 2019-10-29 | End: 2019-10-29 | Stop reason: HOSPADM

## 2019-10-29 RX ORDER — INSULIN LISPRO 100 [IU]/ML
INJECTION, SOLUTION INTRAVENOUS; SUBCUTANEOUS EVERY 6 HOURS
Status: DISCONTINUED | OUTPATIENT
Start: 2019-10-29 | End: 2019-10-30 | Stop reason: HOSPADM

## 2019-10-29 RX ORDER — ONDANSETRON 2 MG/ML
4 INJECTION INTRAMUSCULAR; INTRAVENOUS
Status: DISCONTINUED | OUTPATIENT
Start: 2019-10-29 | End: 2019-10-30 | Stop reason: HOSPADM

## 2019-10-29 RX ORDER — ENOXAPARIN SODIUM 100 MG/ML
40 INJECTION SUBCUTANEOUS EVERY 12 HOURS
Status: DISCONTINUED | OUTPATIENT
Start: 2019-10-29 | End: 2019-10-30 | Stop reason: HOSPADM

## 2019-10-29 RX ORDER — MAGNESIUM SULFATE 100 %
4 CRYSTALS MISCELLANEOUS AS NEEDED
Status: DISCONTINUED | OUTPATIENT
Start: 2019-10-29 | End: 2019-10-30 | Stop reason: HOSPADM

## 2019-10-29 RX ORDER — PROPOFOL 10 MG/ML
INJECTION, EMULSION INTRAVENOUS AS NEEDED
Status: DISCONTINUED | OUTPATIENT
Start: 2019-10-29 | End: 2019-10-29 | Stop reason: HOSPADM

## 2019-10-29 RX ORDER — ROCURONIUM BROMIDE 10 MG/ML
INJECTION, SOLUTION INTRAVENOUS AS NEEDED
Status: DISCONTINUED | OUTPATIENT
Start: 2019-10-29 | End: 2019-10-29 | Stop reason: HOSPADM

## 2019-10-29 RX ORDER — DEXMEDETOMIDINE HYDROCHLORIDE 100 UG/ML
INJECTION, SOLUTION INTRAVENOUS AS NEEDED
Status: DISCONTINUED | OUTPATIENT
Start: 2019-10-29 | End: 2019-10-29 | Stop reason: HOSPADM

## 2019-10-29 RX ADMIN — Medication 100 MG: at 07:32

## 2019-10-29 RX ADMIN — HYDROMORPHONE HYDROCHLORIDE 1 MG: 1 INJECTION, SOLUTION INTRAMUSCULAR; INTRAVENOUS; SUBCUTANEOUS at 08:03

## 2019-10-29 RX ADMIN — PROPOFOL 200 MG: 10 INJECTION, EMULSION INTRAVENOUS at 07:32

## 2019-10-29 RX ADMIN — LIDOCAINE HYDROCHLORIDE 100 MG: 20 INJECTION, SOLUTION EPIDURAL; INFILTRATION; INTRACAUDAL; PERINEURAL at 07:32

## 2019-10-29 RX ADMIN — MIDAZOLAM 2 MG: 1 INJECTION INTRAMUSCULAR; INTRAVENOUS at 07:23

## 2019-10-29 RX ADMIN — FENTANYL CITRATE 100 MCG: 50 INJECTION, SOLUTION INTRAMUSCULAR; INTRAVENOUS at 07:32

## 2019-10-29 RX ADMIN — SODIUM CHLORIDE, SODIUM LACTATE, POTASSIUM CHLORIDE, AND CALCIUM CHLORIDE: 600; 310; 30; 20 INJECTION, SOLUTION INTRAVENOUS at 08:18

## 2019-10-29 RX ADMIN — ACETAMINOPHEN 1000 MG: 10 INJECTION, SOLUTION INTRAVENOUS at 13:09

## 2019-10-29 RX ADMIN — SODIUM CHLORIDE, SODIUM LACTATE, POTASSIUM CHLORIDE, AND CALCIUM CHLORIDE 125 ML/HR: 600; 310; 30; 20 INJECTION, SOLUTION INTRAVENOUS at 06:18

## 2019-10-29 RX ADMIN — ENOXAPARIN SODIUM 40 MG: 40 INJECTION, SOLUTION INTRAVENOUS; SUBCUTANEOUS at 06:19

## 2019-10-29 RX ADMIN — NYSTATIN 500000 UNITS: 100000 SUSPENSION ORAL at 06:20

## 2019-10-29 RX ADMIN — ACETAMINOPHEN 1000 MG: 10 INJECTION, SOLUTION INTRAVENOUS at 07:23

## 2019-10-29 RX ADMIN — CEFAZOLIN SODIUM 3 G: 10 INJECTION, POWDER, FOR SOLUTION INTRAVENOUS at 22:22

## 2019-10-29 RX ADMIN — KETAMINE HYDROCHLORIDE 25 MG: 10 INJECTION, SOLUTION INTRAMUSCULAR; INTRAVENOUS at 08:37

## 2019-10-29 RX ADMIN — DEXMEDETOMIDINE HYDROCHLORIDE 8 MCG: 100 INJECTION, SOLUTION INTRAVENOUS at 09:03

## 2019-10-29 RX ADMIN — Medication 100 MCG: at 07:38

## 2019-10-29 RX ADMIN — METOCLOPRAMIDE 10 MG: 5 INJECTION, SOLUTION INTRAMUSCULAR; INTRAVENOUS at 12:04

## 2019-10-29 RX ADMIN — ALBUTEROL SULFATE 10 PUFF: 90 AEROSOL, METERED RESPIRATORY (INHALATION) at 09:32

## 2019-10-29 RX ADMIN — DEXMEDETOMIDINE HYDROCHLORIDE 6 MCG: 100 INJECTION, SOLUTION INTRAVENOUS at 08:09

## 2019-10-29 RX ADMIN — MORPHINE SULFATE 6 MG: 10 INJECTION INTRAVENOUS at 22:19

## 2019-10-29 RX ADMIN — METOCLOPRAMIDE 10 MG: 5 INJECTION, SOLUTION INTRAMUSCULAR; INTRAVENOUS at 23:49

## 2019-10-29 RX ADMIN — ENOXAPARIN SODIUM 40 MG: 40 INJECTION, SOLUTION INTRAVENOUS; SUBCUTANEOUS at 17:33

## 2019-10-29 RX ADMIN — DEXMEDETOMIDINE HYDROCHLORIDE 6 MCG: 100 INJECTION, SOLUTION INTRAVENOUS at 07:59

## 2019-10-29 RX ADMIN — SODIUM CHLORIDE, SODIUM LACTATE, POTASSIUM CHLORIDE, AND CALCIUM CHLORIDE 125 ML/HR: 600; 310; 30; 20 INJECTION, SOLUTION INTRAVENOUS at 10:09

## 2019-10-29 RX ADMIN — DEXMEDETOMIDINE HYDROCHLORIDE 6 MCG: 100 INJECTION, SOLUTION INTRAVENOUS at 08:53

## 2019-10-29 RX ADMIN — SODIUM CHLORIDE, SODIUM LACTATE, POTASSIUM CHLORIDE, AND CALCIUM CHLORIDE: 600; 310; 30; 20 INJECTION, SOLUTION INTRAVENOUS at 07:46

## 2019-10-29 RX ADMIN — CEFAZOLIN SODIUM 3 G: 10 INJECTION, POWDER, FOR SOLUTION INTRAVENOUS at 14:23

## 2019-10-29 RX ADMIN — FAMOTIDINE 20 MG: 10 INJECTION, SOLUTION INTRAVENOUS at 06:20

## 2019-10-29 RX ADMIN — KETAMINE HYDROCHLORIDE 25 MG: 10 INJECTION, SOLUTION INTRAMUSCULAR; INTRAVENOUS at 08:10

## 2019-10-29 RX ADMIN — SODIUM CHLORIDE, SODIUM LACTATE, POTASSIUM CHLORIDE, AND CALCIUM CHLORIDE: 600; 310; 30; 20 INJECTION, SOLUTION INTRAVENOUS at 08:53

## 2019-10-29 RX ADMIN — DEXMEDETOMIDINE HYDROCHLORIDE 6 MCG: 100 INJECTION, SOLUTION INTRAVENOUS at 08:56

## 2019-10-29 RX ADMIN — CEFAZOLIN SODIUM 3 G: 10 INJECTION, POWDER, FOR SOLUTION INTRAVENOUS at 07:39

## 2019-10-29 RX ADMIN — MORPHINE SULFATE 6 MG: 10 INJECTION INTRAVENOUS at 12:04

## 2019-10-29 RX ADMIN — METOCLOPRAMIDE 10 MG: 5 INJECTION, SOLUTION INTRAMUSCULAR; INTRAVENOUS at 17:33

## 2019-10-29 RX ADMIN — SUGAMMADEX 500 MG: 100 INJECTION, SOLUTION INTRAVENOUS at 09:23

## 2019-10-29 RX ADMIN — DEXMEDETOMIDINE HYDROCHLORIDE 2 MCG: 100 INJECTION, SOLUTION INTRAVENOUS at 08:35

## 2019-10-29 RX ADMIN — DEXMEDETOMIDINE HYDROCHLORIDE 6 MCG: 100 INJECTION, SOLUTION INTRAVENOUS at 08:04

## 2019-10-29 RX ADMIN — KETAMINE HYDROCHLORIDE 50 MG: 10 INJECTION, SOLUTION INTRAMUSCULAR; INTRAVENOUS at 07:32

## 2019-10-29 RX ADMIN — SODIUM CHLORIDE, SODIUM LACTATE, POTASSIUM CHLORIDE, AND CALCIUM CHLORIDE 150 ML/HR: 600; 310; 30; 20 INJECTION, SOLUTION INTRAVENOUS at 11:27

## 2019-10-29 RX ADMIN — ACETAMINOPHEN 1000 MG: 10 INJECTION, SOLUTION INTRAVENOUS at 19:09

## 2019-10-29 RX ADMIN — ONDANSETRON HYDROCHLORIDE 4 MG: 2 INJECTION INTRAMUSCULAR; INTRAVENOUS at 09:19

## 2019-10-29 RX ADMIN — Medication 30 MG: at 08:02

## 2019-10-29 NOTE — PERIOP NOTES
TRANSFER - IN REPORT:    Verbal report received from [address] [place] [address] B, [address] nurse (name) on Cheryle Bottoms  being received from OR (unit) for routine progression of care      Report consisted of patients Situation, Background, Assessment and   Recommendations(SBAR). Information from the following report(s) OR Summary, Procedure Summary, Intake/Output and MAR was reviewed with the receiving nurse. Opportunity for questions and clarification was provided. Assessment completed upon patients arrival to unit and care assumed.

## 2019-10-29 NOTE — INTERVAL H&P NOTE
H&P Update:  Carlos Manuel Nielsen was seen and examined. History and physical has been reviewed. The patient has been examined.  There have been no significant clinical changes since the completion of the originally dated History and Physical.

## 2019-10-29 NOTE — PERIOP NOTES
TRANSFER - OUT REPORT:    Verbal report given to Elliot MELO (name) on Cheryl Amado  being transferred to 3 S (unit) for routine progression of care       Report consisted of patients Situation, Background, Assessment and   Recommendations(SBAR). Information from the following report(s) OR Summary, Procedure Summary, Intake/Output and MAR was reviewed with the receiving nurse. Lines:   Peripheral IV 10/29/19 Right Hand (Active)   Site Assessment Clean, dry, & intact 10/29/2019  9:58 AM   Phlebitis Assessment 0 10/29/2019  9:58 AM   Infiltration Assessment 0 10/29/2019  9:58 AM   Dressing Status Clean, dry, & intact 10/29/2019  9:58 AM   Dressing Type Transparent;Tape 10/29/2019  9:58 AM   Hub Color/Line Status Infusing 10/29/2019  9:58 AM   Action Taken Armboard 10/29/2019  7:49 AM        Intake/Output Summary (Last 24 hours) at 10/29/2019 1033  Last data filed at 10/29/2019 1032  Gross per 24 hour   Intake 4000 ml   Output 280 ml   Net 3720 ml       Opportunity for questions and clarification was provided.       Patient transported with:   O2 @ 2 liters  Registered Nurse

## 2019-10-29 NOTE — ANESTHESIA PREPROCEDURE EVALUATION
Relevant Problems   No relevant active problems       Anesthetic History   No history of anesthetic complications            Review of Systems / Medical History  Patient summary reviewed, nursing notes reviewed and pertinent labs reviewed    Pulmonary            Asthma : well controlled       Neuro/Psych   Within defined limits           Cardiovascular    Hypertension              Exercise tolerance: >4 METS     GI/Hepatic/Renal     GERD: well controlled           Endo/Other    Diabetes  Hypothyroidism  Morbid obesity and arthritis     Other Findings              Physical Exam    Airway  Mallampati: II  TM Distance: 4 - 6 cm  Neck ROM: normal range of motion   Mouth opening: Normal     Cardiovascular  Regular rate and rhythm,  S1 and S2 normal,  no murmur, click, rub, or gallop             Dental  No notable dental hx       Pulmonary  Breath sounds clear to auscultation               Abdominal  GI exam deferred       Other Findings            Anesthetic Plan    ASA: 3  Anesthesia type: general          Induction: Intravenous  Anesthetic plan and risks discussed with: Patient

## 2019-10-29 NOTE — PROGRESS NOTES
1023  TRANSFER - IN REPORT:    Verbal report received from NAI Correia RN(name) on Carlos Manuel Nielsen  being received from PACU(unit) for routine progression of care      Report consisted of patients Situation, Background, Assessment and   Recommendations(SBAR). Information from the following report(s) SBAR, OR Summary, Intake/Output and Recent Results was reviewed with the receiving nurse. Opportunity for questions and clarification was provided. Assessment completed upon patients arrival to unit and care assumed.

## 2019-10-29 NOTE — NURSE NAVIGATOR
Patients  at bedside. Patient complained of expected pain with mild nausea. Vitals:   Visit Vitals  /67   Pulse (!) 56   Temp 97.7 °F (36.5 °C)   Resp 14   Ht 5' 4\" (1.626 m)   Wt 126.6 kg (279 lb 2 oz)   SpO2 100%   BMI 47.91 kg/m²     General: Alert & oriented to person, place, time, and situation  Pulmonary: Lungs clear to auscultation in all fields. Cardiac: Regular rate and rhythm  Abdomen: Appropriate tenderness; soft; non-distended; hypo-active bowel sounds  Lap sites: Within normal limits  JONO drain: Small amount of serosanguinous drainage  Edwards catheter: 200 cc clear, yellow urine  SCD's: In place and operating WNL      Plan:  -Continue medications for symptom management  -Encourage ambulation  -SCD use when in bed  -IS 10 times an hour  -NPO  -UGI in AM; bariatric full liquid diet  if UGI within normal limits  -Edwards removed in AM    Plan was discussed with patient and her , as well as IS teaching provided. Both verbalized understanding to plan and education. Patient completed IS x3 during this visit with no issues or concerns noted by this RN.

## 2019-10-29 NOTE — PROGRESS NOTES
1930  Bedside and Verbal shift change report given to RICHARD Henry (on coming nurse) by Roque Arora RN (off going). Report included the following information SBAR, Kardex, OR Summary, Intake/Output and MAR.

## 2019-10-29 NOTE — PROGRESS NOTES
Problem: Falls - Risk of  Goal: *Absence of Falls  Description  Document Dialupe Lynn Fall Risk and appropriate interventions in the flowsheet.   Outcome: Progressing Towards Goal  Note:   Fall Risk Interventions:  Mobility Interventions: Patient to call before getting OOB         Medication Interventions: Patient to call before getting OOB, Teach patient to arise slowly    Elimination Interventions: Call light in reach, Elevated toilet seat, Patient to call for help with toileting needs              Problem: Pain  Goal: *Control of Pain  Outcome: Progressing Towards Goal     Problem: Gastric Sleeve Pathway / Bariatric Revision Pathway: Day of Surgery  Goal: Activity/Safety  Outcome: Progressing Towards Goal  Goal: Nutrition/Diet  Outcome: Progressing Towards Goal

## 2019-10-29 NOTE — OP NOTES
OPERATIVE REPORT                           Patient:Carlos Manuel Nielsen                 : 1955                Medical Record Number:[id number]                  Pre-operative Diagnosis:  DIABETES, HYPERTENSION, GERD, MORBID OBESITY, BMI 48                Post-operative Diagnosis: DIABETES, HYPERTENSION, GERD, MORBID OBESITY, BMI 48                Procedure: Procedure(s):  LAPAROSCOPIC GASTRIC BYPASS (antecolic / antegastric)  WEDGE LIVER BIOPSY  INTRAOPERATIVE ENDOSCOPY WITH BIOPSY                Location: [facility]                Surgeon: Marilyn Aguayo MD                Assistant:  Dylan Boyd [facility] (there are no qualified interns, residents, or any other qualified house physicians available to assist with this surgery) - performed retraction of various structures, facilitated creating small bowel anastomsis, placed circular stapling cap through the stomach wall, assisted in creating the gastric pouch, fired various stapling devices, obtained hemostasis along staple lines via hemoclips, applied Eviseal,  retrieved all specimens from the abdominal cavity, closed fascial defect, and sutured incisions                    Anesthesia: General                 Specimen:  Liver Wedge  Biopsy                EBL: less than 20 cc                Additional Findings: none                      STATEMENT OF MEDICAL NECESSITY: The patient is a 75y.o.-year-old female who has had a long-standing history of obesity. She has developed health problems related to  obesity and has significant cardiac disease and came to me in consultation  for the gastric bypass procedure. she has undergone preoperative evaluation and was felt to be a reasonable candidate for surgery. I explained to the patient the risks associated with this procedure and she understood all this very clearly and did wish to proceed with operative intervention at this time period.                     OPERATIVE PROCEDURE: The patient was brought to the operating room, placed on the table in the supine position at which time period general anesthesia was administered without any difficulty. The abdomen was then prepped and draped in  The usual sterile fashion. Using a 15 blade, a 1 cm incision was made just to the left of the midline at the level of the umbilicus. A Veres needle approach was used to gain access to the peritoneal cavity which was then insufflated. The Visiport was then placed at that site insufflating the abdomen, then 4 additional trocars were placed in the usual U-shaped configuration with a subxiphoid incision being made to accommodate the [facility] retractor. On entering the abdomen, the patient was noted to have a moderate fatty liver with some evidence of nodularity throughout possibly consistent with early steatohepatitis. I began the operation by choosing an area approximately 50 cm distal to the ligament of Treitz. I transected the small bowel using the Endo JOSAFAT stapler with white reloads, I then divided the mesentery of the small bowel using 3 firings of the Endo JOSAFAT stapler, which allowed for adequate mobilization to the upper abdominal region. I then measured off a 150-cm Kenn limb bypass and placed  it in a side-to-side fashion with the afferent limb placing stay sutures in the 2 limbs of the bowel. I then created enterotomies in the 2 limbs of the bowel and placed the Endo JOSAFAT stapler intra luminally closing it and firing  it creating the linear anastomosis. With this anastomosis being hemostatic, the free margin of the enterotomy was then re approximated using 2-0 Ethibond suture and these sutures were then held up to facilitate closure using the stapling device. The mesenteric defect at this site was then closed  using a running 2-0 Ethibond suture. I then elected to bring the kenn limb anterior to the transverse colon and did so in the usual fashion. The kenn limb reached nicely to the upper abdominal region under no tension.  I then placed a Baker's tube transorally in the stomach and inflated the balloon to 20 mL of saline solution and then I pulled it back towards the gastroesophageal junction. I then at this juncture dissected along the angle of His, exposing the left crura and I likewise dissected along the lesser curvature of the stomach, entering the retro gastric space. Once this space was then developed, I then marked the planned anastomosis of the pouch using a single dot of dilute methylene blue. I then removed the Baker's tube at this juncture. An anterior gastrotomy was then fashioned in the antrum of the stomach, then the cap of a 21 ILS stapler was then placed transabdominally guiding it through the gastrotomy out through the anterior gastric pouch in the area marked using a flamingo grasper and a Prolene suture attached to the cap. After retrieving the cap, a purse string suture was then placed at the base and tied securely. I then created the pouch using the powered [place] stapler with blue reloads. I used one firing along the base of the planned pouch then 3 vertical firings completing this linear 20 mL pouch. With the pouch having been created, the anterior gastrotomy was then closed using the same stapling device. I then at this juncture brought the Kenn limb in a antecolic, antegastric fashion. It reached nicely to the level of the pouch under no tension at all. I then opened up the free end of the Kenn limb using the Harmonic scalpel. I guided the circular stapling device transabdominally through the left lower quadrant incision, guiding it through the enterotomy thendeploying the spear through the antimesenteric border of the bowel. It was then attached to the cap, closed and fired creating the circular anastomosis. With 2 very good tissue rings  noted within the stapling device, the free margin of the Kenn limb was then closed using the Endo JOSAFAT stapler with white reloads.  I then over sewed the anastomosis using 2-0 Ethibond suture. I then left the operative field and proceeded to the the head of the bed and performed an intraoperative EGD. The scope was passed successfully into the gastric pouch to the level of the anastomosis. There was no bleeding noted and no leak appreciated with air insufflation. A biopsy for H. Pylori was performed and submitted to pathology. I then returned to the surgical field. At this juncture I then straightened out the Kenn limb which  passed anterior to the transverse colon. When this was all achieved, I then turned my attention to checking all areas for hemostasis using Eviseal and Sugicel SNOW to achieve this. I then removed the liver retractor and due to its abnormal appearance  I did perform a liver wedge biopsy it to assess for fibrosis and submitted it to pathology for permanent section. I then placed a J-P drain in the upper abdominal region. I removed all trocars and closed the left lower quadrant trocar site using a transabdominal 0 PDS suture along the fascia. All skin incisions were then closed using 4-0 sutures of Monocryl, 2-0 Nylon and Steri-Strips and sterile dressings were applied. The patient tolerated the procedure well.                  Mee Laughlin M.D.

## 2019-10-29 NOTE — ANESTHESIA POSTPROCEDURE EVALUATION
Post-Anesthesia Evaluation and Assessment    Cardiovascular Function/Vital Signs  Visit Vitals  /72   Pulse 62   Temp 36.5 °C (97.7 °F)   Resp 11   Ht 5' 4\" (1.626 m)   Wt 126.6 kg (279 lb 2 oz)   SpO2 99%   BMI 47.91 kg/m²       Patient is status post Procedure(s):  LAPAROSCOPIC GASTRIC BYPASS WITH LIVER BIOPSY AND INTRAOPERATIVE ENDOSCOPY. Nausea/Vomiting: Controlled. Postoperative hydration reviewed and adequate. Pain:  Pain Scale 1: Numeric (0 - 10) (10/29/19 1003)  Pain Intensity 1: 0 (10/29/19 1003)   Managed. Neurological Status:   Neuro (WDL): Exceptions to WDL (10/29/19 0941)   At baseline. Mental Status and Level of Consciousness: Baseline and appropriate for discharge. Pulmonary Status:   O2 Device: Nasal cannula (10/29/19 1003)   Adequate oxygenation and airway patent. Complications related to anesthesia: None    Post-anesthesia assessment completed. No concerns. Patient has met all discharge requirements.     Signed By: Tobin Alvarenga MD    October 29, 2019

## 2019-10-29 NOTE — PROGRESS NOTES
Transition of Care (BEATA) Plan:        Chart reviewed. Pt admitted for an elective surgical procedure. Pt is independent . Please encourage ambulation. No transition of care needs identified at this time. Anticipate pt will be medically stable for discharge within the next 24-48 hours with physician follow up. CM available to assist as needed. BEATA Transportation:   How is patient being transported at discharge? Family/Friend      When? Once cleared by physician     Is transport scheduled? N/A      Follow-up appointment and transportation:   PCP/Specialist?  See AVS for Appointment         Who is transporting to the follow-up appointment? Self/Family/Friend      Is transport for follow up appointment scheduled? N/A    Communication plan (with patient/family): Who is being called? Patient or Next of Kin? Responsible party? Patient      What number(s) is to be used? See Facesheet      What service provider is calling for [facility] services? When are they calling? Readmission Risk? (Green/Low; Yellow/Moderate; Red/High):  Green    Care Management Interventions  Mode of Transport at Discharge:  Other (see comment)(Family)  Transition of Care Consult (CM Consult): Discharge Planning  Health Maintenance Reviewed: Yes  Current Support Network: Lives with Spouse  Confirm Follow Up Transport: Family  Discharge Location  Discharge Placement: Home with family assistance

## 2019-10-30 ENCOUNTER — APPOINTMENT (OUTPATIENT)
Dept: GENERAL RADIOLOGY | Age: 64
DRG: 621 | End: 2019-10-30
Attending: SPECIALIST
Payer: MEDICARE

## 2019-10-30 VITALS
RESPIRATION RATE: 18 BRPM | HEART RATE: 72 BPM | OXYGEN SATURATION: 99 % | SYSTOLIC BLOOD PRESSURE: 166 MMHG | WEIGHT: 279.13 LBS | DIASTOLIC BLOOD PRESSURE: 85 MMHG | HEIGHT: 64 IN | BODY MASS INDEX: 47.65 KG/M2 | TEMPERATURE: 98.1 F

## 2019-10-30 LAB
GLUCOSE BLD STRIP.AUTO-MCNC: 119 MG/DL (ref 70–110)
GLUCOSE BLD STRIP.AUTO-MCNC: 138 MG/DL (ref 70–110)

## 2019-10-30 PROCEDURE — 74011636320 HC RX REV CODE- 636/320: Performed by: SPECIALIST

## 2019-10-30 PROCEDURE — 82962 GLUCOSE BLOOD TEST: CPT

## 2019-10-30 PROCEDURE — 74011000250 HC RX REV CODE- 250: Performed by: SPECIALIST

## 2019-10-30 PROCEDURE — C9113 INJ PANTOPRAZOLE SODIUM, VIA: HCPCS | Performed by: SPECIALIST

## 2019-10-30 PROCEDURE — 74240 X-RAY XM UPR GI TRC 1CNTRST: CPT

## 2019-10-30 PROCEDURE — 74011250636 HC RX REV CODE- 250/636: Performed by: SPECIALIST

## 2019-10-30 PROCEDURE — 74011250637 HC RX REV CODE- 250/637: Performed by: SPECIALIST

## 2019-10-30 RX ORDER — OXYCODONE AND ACETAMINOPHEN 5; 325 MG/1; MG/1
1-2 TABLET ORAL
Status: DISCONTINUED | OUTPATIENT
Start: 2019-10-30 | End: 2019-10-30 | Stop reason: HOSPADM

## 2019-10-30 RX ORDER — ENOXAPARIN SODIUM 100 MG/ML
40 INJECTION SUBCUTANEOUS EVERY 12 HOURS
Qty: 14 SYRINGE | Refills: 0 | Status: SHIPPED
Start: 2019-10-30 | End: 2019-11-06

## 2019-10-30 RX ADMIN — ENOXAPARIN SODIUM 40 MG: 40 INJECTION, SOLUTION INTRAVENOUS; SUBCUTANEOUS at 05:55

## 2019-10-30 RX ADMIN — OXYCODONE HYDROCHLORIDE AND ACETAMINOPHEN 1 TABLET: 5; 325 TABLET ORAL at 11:33

## 2019-10-30 RX ADMIN — SODIUM CHLORIDE, SODIUM LACTATE, POTASSIUM CHLORIDE, AND CALCIUM CHLORIDE 150 ML/HR: 600; 310; 30; 20 INJECTION, SOLUTION INTRAVENOUS at 01:48

## 2019-10-30 RX ADMIN — ACETAMINOPHEN 1000 MG: 10 INJECTION, SOLUTION INTRAVENOUS at 01:48

## 2019-10-30 RX ADMIN — METOCLOPRAMIDE 10 MG: 5 INJECTION, SOLUTION INTRAMUSCULAR; INTRAVENOUS at 05:55

## 2019-10-30 RX ADMIN — IOHEXOL 60 ML: 240 INJECTION, SOLUTION INTRATHECAL; INTRAVASCULAR; INTRAVENOUS; ORAL at 11:14

## 2019-10-30 RX ADMIN — METOCLOPRAMIDE 10 MG: 5 INJECTION, SOLUTION INTRAMUSCULAR; INTRAVENOUS at 11:34

## 2019-10-30 RX ADMIN — SODIUM CHLORIDE 40 MG: 9 INJECTION, SOLUTION INTRAMUSCULAR; INTRAVENOUS; SUBCUTANEOUS at 08:54

## 2019-10-30 NOTE — PROGRESS NOTES
Shift Summary:  received patient alert and oriented x4. 5 lap sites with steri-strips and 2x2, CDI. Left JONO with serosanguinous drainage. Morphine IV given for abdominal pain with relief. Denies nausea or vomiting through out the shift. Edwards catheter removed without any abnormalities. Patent walks to the bathroom and in the hallway without any difficulty. [id number] and Verbal shift change report given to Yasmin Wright RN (oncoming nurse) by Dominguez House RN   (offgoing nurse). Report included the following information SBAR, Procedure Summary, Intake/Output, MAR and Recent Results.

## 2019-10-30 NOTE — NURSE NAVIGATOR
Patient sipping protein drink and eating sugar-free pudding and tolerating well. Vitals:   Blood pressure 164/72, pulse 91, temperature 98.1 °F (36.7 °C), resp. rate 16, height 5' 4\" (1.626 m), weight 126.6 kg (279 lb 2 oz), SpO2 96 %. Blood pressure reported to Dr. Everett Espitia, who ordered to discontinue IV fluids. Order placed by this RN. Jason Anderson RN, informed. Output: reviewed, and patient/RN verified urinating clear, yellow urine. Pulmonary: Clear in all lobes  Cardiac: Regular rate and rhythm  Abdomen: Bowel sounds hypo-active x4. Lap sites without erythema, swelling,  and/or drainage. JONO drain discontinued; dressing clean, dry, and intact. SCD's: Positioned and operating WNL    Patient with expected pain, but is being managed and is currently tolerable. No nausea and/or vomiting. Patient has been ambulating the halls. Patient has not had the opportunity to swallow pills. Post-op diet progression discussed with patient. Patient to be discharged on a bariatric full liquid diet. Patient verbalized understanding of liquid diet for next two weeks until first post-op visit. Goal of four ounces per hour with one ounce being protein was clearly understood. Medications were discussed (i.e., pain- Percocet, Tylenol, not to use aspirin or ibuprofen based products, as well as steroids). Constipation was discussed and ways to alleviate it were discussed. Education completed on IS use and to ambulate every hour when at home. Patient completed a return demonstration on IS with no issues or concerns from this RN. All scripts given during pre-op visit were filled and in the home. Lovenox education provided, and patient will be administering herself. Ketosis was also reviewed. Patient given a report card to record intake and a handout to support bedside education. All questions were answered by this RN, and patient verbalized understanding to all education provided.   Goals for discharge were discussed, and patient verbalized understanding. Post-op follow-up appt. viktor scheduled.

## 2019-10-30 NOTE — DISCHARGE INSTRUCTIONS
[facility]  Reece Saavedra. Everett Espitia M.D., F.A.C.S.  [address] [place] [address]., [address] [place] [address], [address] [place] [address]  Office: [phone]    Fax:  [phone]    Discharge Instruction for Gastric Bypass Patients    Diet:   Continue with the liquid diet until you are seen in the office. Make sure you sip fluids all day. Goal for total fluid intake is a minimum of 64 ounces per day.  Aim for  Grams of protein every day. Occasionally protein shakes with whey protein will cause diarrhea after surgery due to newly developed lactose intolerance. If you experience this, there are other protein drinks on the market that do not use milk proteins such as whey. Activity:   Get up and walk at least once an hour during normal waking hours.  Walk a minimum of 30 minutes every day for exercise. Rest when you are tired.  Use your Incentive Spirometry (plastic breathing machine) 10 times every hour for two weeks. Take a slow steady breath in until you can not inhale anymore.  You may shower. No baths, hot tubs or swimming.  You may climb stairs. Take your time.  No lifting more than 10-15 lbs.  If you feel discomfort during an activity, rest.   Do not drive for 1 week or until you are off of all narcotics. Wound Care:   Clean incisions with soap and water when in the shower. Pat dry.  Leave steri-strips on until they fall off.  A small amount of drainage may be present from the incisions. Contact the office if you notice an increase in drainage, an odor, increased redness, or fever > 100.5. Medications:   You should have received a prescription for pain medication and Prilosec prior to surgery, if not, notify Dr. Simran Ruiz team.   Key Cough every day until your prescription runs out. It helps the healing process and helps prevent you from having the hiccups and acid reflux.     For upset stomach you may take over the counter medications such as Maalox, Mylanta, or Pepto Bismol.  Gas X works very well for bloating when eating or drinking.  You may take Tylenol. Do not exceed 4,000mg of Tylenol in one day. Percocet has Tylenol in it, you will need to consider this when taking Tylenol and Percocet together.  Non-aspirin based arthritis medications may be resumed.  Take a childrens or adult chewable multivitamin. Take a probiotic as well for the first month.  Milk of Magnesia will provide immediate relief of constipation (not for daily use). Daily use of Miralax or Benefiber may be needed if you develop chronic constipation.  It is fine to take your usual home medications. Blood pressure medications should be continued after surgery, unless it is a diuretic. Diabetic medications can frequently be reduced very quickly after surgery. Diabetics should continue to monitor blood sugars frequently after surgery and contact the prescribing physician for any questions. Follow-Up Appointment:   If you do not already have a follow-up appointment scheduled, contact the office in the next few days to obtain one. It is usually scheduled for 10-14 days after your surgery date. Office phone number:  [phone].  Call our office if you have questions, concerns or any worsening of condition. If you are not able to reach us, go to your primary care provider or the Emergency Department.

## 2019-10-30 NOTE — PROGRESS NOTES
Bariatric Surgery                POD #1    Visit Vitals  /85   Pulse 72   Temp 98.1 °F (36.7 °C)   Resp 18   Ht 5' 4\" (1.626 m)   Wt 126.6 kg (279 lb 2 oz)   SpO2 99%   BMI 47.91 kg/m²     Patient has minimal complaints of pain, minimal nausea noted     Exam:  Appears well in no distress  Lungs- clear bilaterally  Abd - soft, incisions look good without erythema           JONO with minimal serosanguinous output  Extremities- no new edema or swelling    UGI - no obstrustion or leak    Data Review:    Labs: Results:       Chemistry No results for input(s): GLU, NA, K, CL, CO2, BUN, CREA, CA, AGAP, BUCR, TBIL, GPT, AP, TP, ALB, GLOB, AGRAT in the last 72 hours. CBC w/Diff No results for input(s): WBC, RBC, HGB, HCT, PLT, GRANS, LYMPH, EOS, RETIC, HGBEXT, HCTEXT, PLTEXT in the last 72 hours. Coagulation No results for input(s): PTP, INR, APTT, INREXT in the last 72 hours. Liver Enzymes No results for input(s): TP, ALB, TBIL, AP, SGOT, GPT in the last 72 hours. No lab exists for component: DBIL       Assessment/Plan: S/P  laparoscopic gastric bypass surgery - doing well without any issues    1. Start bariatric diet and protein shakes  2. D/C IV pain meds and start PO pain meds  3. D/C JONO drain  4. Likley PM D/C if  Cont ok and tolerate PO

## 2019-10-30 NOTE — PROGRESS NOTES
Bariatric Surgery                POD #1    Visit Vitals  /72   Pulse 91   Temp 98.1 °F (36.7 °C)   Resp 16   Ht 5' 4\" (1.626 m)   Wt 126.6 kg (279 lb 2 oz)   SpO2 96%   BMI 47.91 kg/m²     Patient has minimal complaints of pain, minimal nausea noted     Exam:  Appears well in no distress  Lungs- clear bilaterally  Abd - soft, incisions look good without erythema           JONO with minimal serosanguinous output  Extremities- no new edema or swelling    UGI - pending    Data Review:    Labs: Results:       Chemistry No results for input(s): GLU, NA, K, CL, CO2, BUN, CREA, CA, AGAP, BUCR, TBIL, GPT, AP, TP, ALB, GLOB, AGRAT in the last 72 hours. CBC w/Diff No results for input(s): WBC, RBC, HGB, HCT, PLT, GRANS, LYMPH, EOS, RETIC, HGBEXT, HCTEXT, PLTEXT in the last 72 hours. Coagulation No results for input(s): PTP, INR, APTT, INREXT in the last 72 hours. Liver Enzymes No results for input(s): TP, ALB, TBIL, AP, SGOT, GPT in the last 72 hours. No lab exists for component: DBIL       Assessment/Plan: S/P  laparoscopic gastric bypass surgery - doing well without any issues    Orders are pending until UGI study shows normal anatomy. 1.Start bariatric diet and protein shakes  2. D/C IV pain meds and start PO pain meds  3. D/C JONO drain  4. Likely PM D/C if continues to be okay and tolerates PO

## 2019-10-30 NOTE — PROGRESS NOTES
Pt comfortable throughout shift. Ambulating and voiding without difficulty. Tolerating bariatric full liquid diet well. IVF discontinued d/t elevated BP. Minimal JONO output prior to removal.  Pt is engaged in postop POC. No new concerns/complaints noted.

## 2019-10-30 NOTE — PROGRESS NOTES
CM met with pt at bedside. Pt is  and independent. Pt does not have/use DME. Anticipate pt will transition home today with physician follow up. No other transition of care needs have been identified. Care Management Interventions  Mode of Transport at Discharge:  Other (see comment)(Family)  Transition of Care Consult (CM Consult): Discharge Planning  Health Maintenance Reviewed: Yes  Current Support Network: Lives with Spouse  Confirm Follow Up Transport: Family  Plan discussed with Pt/Family/Caregiver: Yes  Discharge Location  Discharge Placement: Home with family assistance

## 2019-10-30 NOTE — PROGRESS NOTES
Summary - Pt comfortable throughout shift. Reports minimal pain, responsive to prescribed regimen. Tolerating full liquids without n/v/increase in pain. Ambulating and voiding without difficulty. IVF discontinued d/t elevated BP. No new concerns/complaints noted. Very engaged in postop teaching and POC. Patient Vitals for the past 12 hrs:   Temp Pulse Resp BP SpO2   10/30/19 1201 98.1 °F (36.7 °C) 72 18 166/85 99 %   10/30/19 0900 -- -- -- -- 96 %   10/30/19 0618 98.1 °F (36.7 °C) 91 16 164/72 96 %   10/30/19 0316 98.2 °F (36.8 °C) 90 18 169/68 96 %     Pt discharged by YAEL Aguilar RN.

## 2019-10-30 NOTE — DISCHARGE SUMMARY
Discharge Summary    Patient: Edwin Powell               Sex: female          DOA: 10/29/2019         YOB: 1955      Age:  75 y.o.        LOS:  LOS: 1 day                Admit Date: 10/29/2019    Discharge Date: 10/30/2019    Admission Diagnoses: Morbid obesity with body mass index (BMI) of 40.0 to 49.9 ([facility] [E66.01]    Discharge Diagnoses:    Problem List as of 10/30/2019 Date Reviewed: 10/29/2019          Codes Class Noted - Resolved    Morbid obesity with body mass index (BMI) of 40.0 to 49.9 [facility]) ICD-10-CM: E66.01  ICD-9-CM: 278.01  10/29/2019 - Present        Hypertension ICD-10-CM: I10  ICD-9-CM: 401.9  Unknown - Present        Hyperlipidemia ICD-10-CM: E78.5  ICD-9-CM: 272.4  Unknown - Present        Asthma ICD-10-CM: J45.909  ICD-9-CM: 493.90  Unknown - Present        Diabetes ([facility] ICD-10-CM: E11.9  ICD-9-CM: 250.00  Unknown - Present        GERD (gastroesophageal reflux disease) ICD-10-CM: K21.9  ICD-9-CM: 530.81  Unknown - Present        * (Principal) Morbid obesity with BMI of 45.0-49.9, adult [facility]) ICD-10-CM: E66.01, Z68.42  ICD-9-CM: 278.01, V85.42  4/15/2019 - Present        Hypothyroid ICD-10-CM: E03.9  ICD-9-CM: 244.9  Unknown - Present        Low back pain ICD-10-CM: M54.5  ICD-9-CM: 724.2  Unknown - Present        Arthritis of knee ICD-10-CM: M17.10  ICD-9-CM: 716.96  Unknown - Present        Obesity, morbid ([facility] ICD-10-CM: E66.01  ICD-9-CM: 278.01  7/26/2018 - Present              Discharge Condition: Good    Hospital Course: The patient underwent  laparoscopic gastric bypass surgery  on 10/29/2019. The patient tolerated the procedure well. Vital signs remained stable and the patient was transferred to  3rd floor surgical unit without complications. The patient remained stable throughout the first night post operatively with stable vital signs and adequate urine output and pain control. Pain was controlled with Morphine IV and IV Tylenol .   The patient on the first morning post operative was transferred to the radiology suite where they underwent a gastrograffin UGI study which showed no evidence of a leak or stricture. The drain was discontinued on POD # 1 and the patient was started on a bariatric liquid diet with protein shakes. The patient progressed throughout the day and was ambulating well and tolerating their diet. They were therefore discharged home with instructions to notify me with any issues that may arise. Significant Diagnostic Studies:   No results for input(s): HGB, HGBEXT in the last 72 hours. No results for input(s): HCT, HCTEXT in the last 72 hours. Current Discharge Medication List      CONTINUE these medications which have CHANGED    Details   enoxaparin (LOVENOX) 40 mg/0.4 mL 0.4 mL by SubCUTAneous route every twelve (12) hours every twelve (12) hours for 7 days. Indications: Deep Vein Thrombosis Prevention  Qty: 14 Syringe, Refills: 0         CONTINUE these medications which have NOT CHANGED    Details   QUEtiapine (SEROQUEL) 25 mg tablet Take 25 mg by mouth nightly. Indications: prn      omeprazole (PRILOSEC) 20 mg capsule Take 1 Cap by mouth daily. Qty: 30 Cap, Refills: 2      levothyroxine (SYNTHROID) 75 mcg tablet Take  by mouth Daily (before breakfast). cetirizine (ZYRTEC) 10 mg tablet Take 10 mg by mouth daily as needed. Refills: 6      dextroamphetamine-amphetamine (ADDERALL) 30 mg tablet dextroamphetamine-amphetamine 30 mg tablet   TK 1 T PO BID      DULoxetine (CYMBALTA) 20 mg capsule Take 20 mg by mouth daily. VENTOLIN HFA 90 mcg/actuation inhaler INHALE 2 PUFFS BY MOUTH EVERY 6 HOURS  Refills: 1         STOP taking these medications       hydroCHLOROthiazide (HYDRODIURIL) 25 mg tablet Comments:   Reason for Stopping:         aspirin delayed-release 81 mg tablet Comments:   Reason for Stopping:               Activity: activity as tolerated with no heavy lifting of greater than 20 pounds. No anti- inflammatory medications.  Use stool softeners at home as needed while taking pain medications since they are constipating. Diet: Bariatric liquid diet    Wound Care: Keep wound clean and dry, Reinforce dressing PRN and ice to area for comfort. Do not get wound wet for 2 days.     Follow-up: 14 days with Dr Melodie Delgado M.D

## 2019-10-30 NOTE — PROGRESS NOTES
Discharge instructions reviewed with the patient. Patient verbalized understanding and verified by teach back. All questions answered. IV discontinued, no redness, swelling or pain noted. Patient awaiting  for transportation home, with an ETA of 4pm. Patient armband removed and shredded.

## 2019-10-30 NOTE — ROUTINE PROCESS
Bedside shift change report given to Pacheco Jasso RN (oncoming nurse) by Ramona Peters RN (offgoing nurse). Report included the following information SBAR, Kardex, ED Summary, Intake/Output, MAR and Recent Results.

## 2019-10-31 ENCOUNTER — TELEPHONE (OUTPATIENT)
Dept: SURGERY | Age: 64
End: 2019-10-31

## 2019-10-31 NOTE — TELEPHONE ENCOUNTER
This RN spoke with patient post-operatively. Sipping: Patient has sipped 97.8 ounces today. She has tolerated protein shakes, water, ice, and sugar-free Jello. Nausea and/or vomitting: None    Pain: Currently managed with Percocet and/or Tylenol    Lovenox injections: Administering every 12 hours, rotating sites. RN reminded patient to complete all injections, in which patient verbalized understanding. Lap sites: No erythema, drainage, and/or swelling. Patient was confused and removed all her steri-strips. She denied any drainage. RN informed her to contact the office with drainage, and she verbalized understanding. JONO drain site: No drainage; dressing removed    BM: None to date, but is passing flatus. Ambulation: Patient is walking throughout house every hour. IS: Patient continues to use 10x's per hour while awake. Temperature: 96.5 degrees    Pulse: 79 bpm    BP: 155/86    Medications: (confirmed currently taking)   *Multi-vitamin: yes   *Probiotic: It is at her house, but hasn't taken it to date. She will take it today. *Prilosec: yes    Questions: None    This RN reminded the patient to contact the office with any questions and/or concerns. RN also reminded patient they will receive another follow-up TC prior to the two week post-op follow-up appointment. Patient verbalized understanding to both. Patient's two week post-op visit is scheduled and was confirmed.

## 2019-11-04 ENCOUNTER — TELEPHONE (OUTPATIENT)
Dept: SURGERY | Age: 64
End: 2019-11-04

## 2019-11-04 NOTE — TELEPHONE ENCOUNTER
Patient is sipping over 100 ounces of fluid daily. She stated she ate salmon last night and cream of wheat this morning. RN informed her she needed to stay on the full liquid diet for two weeks, as failure to do so can be life threatening and referred her to the bariatric handbook. She verbalized understanding. Jose Morales, and [place], [place], informed. BM's daily. Taking probiotic daily.

## 2019-11-08 ENCOUNTER — TELEPHONE (OUTPATIENT)
Dept: SURGERY | Age: 64
End: 2019-11-08

## 2019-11-08 PROBLEM — Z98.84 S/P GASTRIC BYPASS: Status: ACTIVE | Noted: 2019-11-08

## 2019-11-08 PROBLEM — K90.9 INTESTINAL MALABSORPTION: Status: ACTIVE | Noted: 2019-11-08

## 2019-11-08 PROBLEM — K75.81 STEATOHEPATITIS: Status: ACTIVE | Noted: 2019-11-08

## 2019-11-13 NOTE — PROGRESS NOTES
Subjective:      Kimberly Kc is a 75 y.o. female is now 2 weeks status post laparoscopic gastric bypass surgery. Doing well overall. She has lost a total of 16 pounds since surgery. Body mass index is 45.44 kg/m². Currently on a liquid diet without difficulty. Taking in 70oz water daily. Sources of protein include protein shakes. 30 min of activity 7 days a week, including walking. Patient is sleeping 6 hours a night on average. Bowel movements are regular. The patient is not having any pain. . The patient is compliant with multivitamins. Surgery related complications: none. Liver bx report reviewed with patient. Stomach bx report reviewed with patient.     Weight Loss Metrics 11/14/2019 10/29/2019 10/21/2019 10/17/2019 8/28/2019 8/28/2019 7/25/2019   Today's Wt 264 lb 11.2 oz 279 lb 2 oz 281 lb 282 lb 276 lb 276 lb 272 lb 8 oz   BMI 45.44 kg/m2 47.91 kg/m2 48.23 kg/m2 48.41 kg/m2 47.38 kg/m2 47.38 kg/m2 46.77 kg/m2          Comorbidities:    Hypertension: improved, off medications  Diabetes: improved, off medications, blood sugar range 86-95  Obstructive Sleep Apnea: not applicable  Hyperlipidemia: not applicable  Stress Urinary Incontinence: improved  Gastroesophageal Reflux: improved, on PPI  Weight related arthropathy:improved, bilateral knee pain     Patient Active Problem List   Diagnosis Code    Obesity, morbid ([facility] E66.01    Hypertension I10    Hyperlipidemia E78.5    Asthma J45.909    Diabetes ([facility] E11.9    GERD (gastroesophageal reflux disease) K21.9    Morbid obesity with BMI of 45.0-49.9, adult ([facility] E66.01, Z68.42    Hypothyroid E03.9    Low back pain M54.5    Arthritis of knee M17.10    Morbid obesity with body mass index (BMI) of 40.0 to 49.9 ([facility]) E66.01    Steatohepatitis K75.81    Intestinal malabsorption K90.9    S/P gastric bypass Z98.84        Past Medical History:   Diagnosis Date    Arthritis of knee     generalized    Asthma     seasonal    Diabetes ([facility] oral medications    GERD (gastroesophageal reflux disease)     Hyperlipidemia     Hypertension     situational    Hypothyroid     Intestinal malabsorption 11/8/2019    Low back pain     Pituitary tumor     adenoma    S/P gastric bypass 11/8/2019    10/29/19 by Dr. Everardo Mancera Steatohepatitis 11/8/2019       Past Surgical History:   Procedure Laterality Date    ABDOMEN SURGERY PROC UNLISTED      abdominoplasty    HX HYSTERECTOMY      HX ORTHOPAEDIC Right     Sx due to broken ankle- screw placed    HX OTHER SURGICAL  01/28/2019    Pituitary tumor resection, Dr. Saucedo Ahr      pituitary surgery for adenoma       Current Outpatient Medications   Medication Sig Dispense Refill    multivitamin with iron (FLINTSTONES) chewable tablet Take 1 Tab by mouth daily.  B.infantis-B.ani-B.long-B.bifi (PROBIOTIC 4X) 10-15 mg TbEC Take  by mouth.  ursodiol (TJ FORTE) 500 mg tablet Take 1 Tab by mouth daily for 30 days. 30 Tab 4    omeprazole (PRILOSEC) 20 mg capsule Take 1 Cap by mouth daily. 30 Cap 2    VENTOLIN HFA 90 mcg/actuation inhaler INHALE 2 PUFFS BY MOUTH EVERY 6 HOURS  1    cetirizine (ZYRTEC) 10 mg tablet Take 10 mg by mouth daily as needed.   6       No Known Allergies    Review of Systems:  General - No history or complaints of unexpected fever or chills  Head/Neck - No history or complaints of headache or dizziness  Cardiac - No history or complaints of chest pain, palpitations, or shortness of breath  Pulmonary - No history or complaints of shortness of breath or productive cough  Gastrointestinal - as noted above  Genitourinary - No history or complaints of hematuria/dysuria or renal lithiasis  Musculoskeletal - No history or complaints of joint  muscular weakness  Hematologic - No history of any bleeding episodes  Neurologic - No history or complaints of  migraine headaches or neurologic symptoms    Objective:     Visit Vitals  /82 (BP 1 Location: Right arm, BP Patient Position: Sitting)   Pulse 75   Temp 98.2 °F (36.8 °C)   Ht 5' 4\" (1.626 m)   Wt 120.1 kg (264 lb 11.2 oz)   SpO2 100%   BMI 45.44 kg/m²       General:  alert, cooperative, no distress, appears stated age   Chest: lungs clear to auscultation, breath sounds equal and symmetric, no rhonchi, rales or wheezes, no accessory muscle use   Cor:   Regular rate and rhythm or without murmur or extra heart sounds   Abdomen: soft, bowel sounds active, non-tender, no masses or organomegaly   Incisions:   healing well, no drainage, no erythema, no hernia, no seroma, no swelling, no dehiscence, incision well approximated     Recent Results (from the past 2016 hour(s))   EKG, 12 LEAD, INITIAL    Collection Time: 10/24/19  1:01 PM   Result Value Ref Range    Ventricular Rate 76 BPM    Atrial Rate 76 BPM    P-R Interval 156 ms    QRS Duration 72 ms    Q-T Interval 344 ms    QTC Calculation (Bezet) 387 ms    Calculated P Axis 52 degrees    Calculated R Axis 40 degrees    Calculated T Axis 9 degrees    Diagnosis       Normal sinus rhythm with sinus arrhythmia  Nonspecific ST and T wave abnormality  Abnormal ECG  Confirmed by Koki Ortiz MD, [facility] ([id number]) on 10/25/2019 3:06:27 AM     CBC WITH AUTOMATED DIFF    Collection Time: 10/24/19  1:11 PM   Result Value Ref Range    WBC 6.5 4.6 - 13.2 K/uL    RBC 5.21 4.20 - 5.30 M/uL    HGB 13.4 12.0 - 16.0 g/dL    HCT 42.7 35.0 - 45.0 %    MCV 82.0 74.0 - 97.0 FL    MCH 25.7 24.0 - 34.0 PG    MCHC 31.4 31.0 - 37.0 g/dL    RDW 14.4 11.6 - 14.5 %    PLATELET 933 429 - 491 K/uL    MPV 10.1 9.2 - 11.8 FL    NEUTROPHILS 49 40 - 73 %    LYMPHOCYTES 42 21 - 52 %    MONOCYTES 7 3 - 10 %    EOSINOPHILS 1 0 - 5 %    BASOPHILS 1 0 - 2 %    ABS. NEUTROPHILS 3.2 1.8 - 8.0 K/UL    ABS. LYMPHOCYTES 2.7 0.9 - 3.6 K/UL    ABS. MONOCYTES 0.5 0.05 - 1.2 K/UL    ABS. EOSINOPHILS 0.1 0.0 - 0.4 K/UL    ABS.  BASOPHILS 0.0 0.0 - 0.1 K/UL    DF AUTOMATED     METABOLIC PANEL, COMPREHENSIVE    Collection Time: 10/24/19  1:11 PM   Result Value Ref Range    Sodium 138 136 - 145 mmol/L    Potassium 4.4 3.5 - 5.5 mmol/L    Chloride 103 100 - 111 mmol/L    CO2 30 21 - 32 mmol/L    Anion gap 5 3.0 - 18 mmol/L    Glucose 105 (H) 74 - 99 mg/dL    BUN 14 7.0 - 18 MG/DL    Creatinine 0.93 0.6 - 1.3 MG/DL    BUN/Creatinine ratio 15 12 - 20      GFR est AA >60 >60 ml/min/1.73m2    GFR est non-AA >60 >60 ml/min/1.73m2    Calcium 9.5 8.5 - 10.1 MG/DL    Bilirubin, total 0.3 0.2 - 1.0 MG/DL    ALT (SGPT) 23 13 - 56 U/L    AST (SGOT) 12 10 - 38 U/L    Alk.  phosphatase 157 (H) 45 - 117 U/L    Protein, total 7.3 6.4 - 8.2 g/dL    Albumin 3.3 (L) 3.4 - 5.0 g/dL    Globulin 4.0 2.0 - 4.0 g/dL    A-G Ratio 0.8 0.8 - 1.7     HEMOGLOBIN A1C WITH EAG    Collection Time: 10/24/19  1:11 PM   Result Value Ref Range    Hemoglobin A1c 6.2 (H) 4.2 - 5.6 %    Est. average glucose 131 mg/dL   TYPE & SCREEN    Collection Time: 10/24/19  1:12 PM   Result Value Ref Range    Crossmatch Expiration 11/01/2019     ABO/Rh(D) B POSITIVE     Antibody screen NEG    GLUCOSE, POC    Collection Time: 10/29/19  6:16 AM   Result Value Ref Range    Glucose (POC) 98 70 - 110 mg/dL   GLUCOSE, POC    Collection Time: 10/29/19  9:43 AM   Result Value Ref Range    Glucose (POC) 136 (H) 70 - 110 mg/dL   GLUCOSE, POC    Collection Time: 10/29/19  1:13 PM   Result Value Ref Range    Glucose (POC) 133 (H) 70 - 110 mg/dL   GLUCOSE, POC    Collection Time: 10/29/19  6:45 PM   Result Value Ref Range    Glucose (POC) 138 (H) 70 - 110 mg/dL   GLUCOSE, POC    Collection Time: 10/29/19 11:48 PM   Result Value Ref Range    Glucose (POC) 115 (H) 70 - 110 mg/dL   GLUCOSE, POC    Collection Time: 10/30/19  6:24 AM   Result Value Ref Range    Glucose (POC) 119 (H) 70 - 110 mg/dL   GLUCOSE, POC    Collection Time: 10/30/19 11:24 AM   Result Value Ref Range    Glucose (POC) 138 (H) 70 - 110 mg/dL       Pathology:     A: STOMACH, CARDIA, BIOPSY: FUNDIC-TYPE GASTRIC MUCOSA WITH MILD CHRONIC INFLAMMATION (SEE   MICROSCOPIC DESCRIPTION). H pylori negative. B: LIVER, WEDGE BIOPSY:   STEATOHEPATITIS. FOCAL NON-SPECIFIC INTERFACE HEPATITIS. Assessment:   History of Morbid obesity, status post laparoscopic gastric bypass surgery. Doing well postoperatively. Steatohepatitis - referral to hepatology, Dr. Kuldeep Eldridge sent to pharmacy, will start in two weeks. Sleep goal is 7-9 hours each night. Patient education given on the effects of sleep deprivation on weight control. HTN - f/u w/ PCP  DM - monitor blood glucose levels and f/u PCP  HEATHER - will consider referral to [employer] for Pelvic Floor training in the future  GERD - continue PPI  Asthma - Continue meds and follow up with PCP    Plan:       1. Advance diet to soft solid phase. Reminded to measure portions, continue high protein, low carbohydrate diet. Reminded to eat regularly, to eat slowly & not to drink with meals. Refer to the handbook given in class. 2. Sleep goal is 7-9 hours each night. Patient education given on the effects of sleep deprivation  3. Continue multivitamin   4. Continue current medications and follow up with PCP for management of regimen. 5. Encouraged to attend support group   6. I have discussed this plan with patient and they verbalized understanding  7. Follow up in 2 weeks or sooner if patient has questions, concerns or worsening of condition, if unable to reach our office, patient should report to the ED. 8. Ms. Ulisses Quesada has a reminder for a \"due or due soon\" health maintenance. I have asked that she contact her primary care provider for a follow-up on this health maintenance.

## 2019-11-13 NOTE — PATIENT INSTRUCTIONS
Patient Instructions      1. Remember hydration goals - minimum of 64 ounces of liquids per day (dehydration is the number one reason for hospital readmission). 2. Sleep 7-9 hours each night to keep your metabolism up. 3. Continue to monitor carbohydrate and protein intake you need a minimum of  Grams of protein daily- remember to keep your total carbohydrates to 50 grams or less per day for best results. 4. To maximize weight loss keep your caloric intake between 800-1,200 calories daily. If you are exercising excessively, such as training for a marathon, you need to keep a food log and meet with the dietician so they can advise you on your diet choices, carbohydrate intake and caloric intake. 5. Continue to work towards exercise goals - 60-90 minutes, 5 times a week minimum of deliberate, aerobic exercise is the ultimate goal with strength training 2 times each week. Refer to [email or web address] for  information. 6. Remember to take vitamins as directed in your handbook. 7. Attend support group the 2nd Thursday of each month. 8. Constipation: Milk of Magnesia is for immediate relief only. Miralax is to be used every day if constipation is a chronic problem. 9. Diarrhea: patients will occasionally develop lactose intolerance after surgery. Check to see if your protein shake has whey in it. If it does try a protein powder or drink that does not have whey and stop all yogurts, cheeses and milks to see if the diarrhea goes away. 10. If you have had labs drawn. We will only call you if you have abnormal results. Otherwise you can access the lab results in \"[employer]\". You will only need the access code the first time you sign on. 11. Call us at [phone] or email us through [email or web address]" with questions,     concerns or worsening of condition, we have someone on call 24 hours a day.   If you are unable to reach our office, you are to go to your Primary Care Physician or the Emergency Department. NOTE TO GASTRIC BYPASS PATIENTS:  (SAME APPLIES TO GASTRIC SLEEVE PATIENTS FOR FIRST TWO MONTHS)  Remember that for the rest of your life, you are not able to take the following:  - NSAIDs (ibuprofen, goody powder, BC powder, Motrin, Advil, Mobic, Voltaren, Excedrin, etc.)  - Steroid pills or injections  - Smoke (cigarettes or recreational drugs)  - Alcohol  Use of any of the above may cause ulcers in your stomach which may perforate causing a medical emergency and surgery. Speak to our medical staff if another medical provider requires you to take steroids or NSAIDs. Supplement Resource Guide    Importance of Protein:   Maintains lean body mass, produces antibodies to fight off infections, heals wounds, minimizes hair loss, helps to give you energy, helps with satiety, and keeping you full between meals. Importance of Calcium:  Needed for healthy bones and teeth, normal blood clotting, and nervous system functioning, higher risk of osteoporosis and bone disease with non-compliance. Importance of Multivitamins: Many functions. Supply you with extra nutrients that you may be missing from food. May lead to iron deficiency anemia, weakness, fatigue, and many other symptoms with non-compliance. Importance of B Vitamins:  Important for red blood cell formation, metabolism, energy, and helps to maintain a healthy nervous system. Protein Supplement  Liquid diet phase: consume 90-100g protein daily. Once you are eating consume  35-50g protein each day from your protein supplement. 0-3 g fat per serving  0-3 g sugar per serving    The body can only absorb 30g of protein at one time, so do not consume more than that at one time. Multi-vitamin Supplement:    Start immediately after surgery: any complete chewable, such as: Hebrons Complete chewables. Avoid Hebron sours or gummies.   They lack iron and other important nutrients and also have added sugar. Continue with a chewable vitamin or change to an adult complete multivitamin one month after surgery. Menstruating women can take a prenatal vitamin. Make sure it has at least 18 mg iron and 389-807 mcg folic acid Calcium Supplement:     Start taking within one month after surgery. Look for:   Calcium Citrate Plus D (1500 mg per day)    Recommend: Citracal    Avoid chocolate chewable calcium. Can use chewable bariatric or [employer] brand or similar chewable. The body cannot absorb more than 500-600 mg of calcium at one time. Take for Life    Vitamin D  Take 3,000 international units daily Vitamin B12  B Complex Vitamin  Start taking both within one month after surgery. Vitamin B12 (sublingual): Take 1000 mcg of Vitamin B12 three times weekly    Must take sublingually (meaning you put it under your tongue) or in a liquid drop form for easy absorption. B Complex Vitamin:   Take one pill daily or liquid drop form daily; as directed on bottle.      Take for Life

## 2019-11-14 ENCOUNTER — OFFICE VISIT (OUTPATIENT)
Dept: SURGERY | Age: 64
End: 2019-11-14

## 2019-11-14 ENCOUNTER — CLINICAL SUPPORT (OUTPATIENT)
Dept: SURGERY | Age: 64
End: 2019-11-14

## 2019-11-14 VITALS — WEIGHT: 264 LBS | BODY MASS INDEX: 45.07 KG/M2 | HEIGHT: 64 IN

## 2019-11-14 VITALS
WEIGHT: 264.7 LBS | TEMPERATURE: 98.2 F | BODY MASS INDEX: 45.19 KG/M2 | SYSTOLIC BLOOD PRESSURE: 130 MMHG | OXYGEN SATURATION: 100 % | HEART RATE: 75 BPM | DIASTOLIC BLOOD PRESSURE: 82 MMHG | HEIGHT: 64 IN

## 2019-11-14 DIAGNOSIS — Z98.84 S/P BARIATRIC SURGERY: Primary | ICD-10-CM

## 2019-11-14 DIAGNOSIS — K75.81 STEATOHEPATITIS: ICD-10-CM

## 2019-11-14 DIAGNOSIS — K90.9 INTESTINAL MALABSORPTION, UNSPECIFIED TYPE: Primary | ICD-10-CM

## 2019-11-14 DIAGNOSIS — Z98.84 S/P BARIATRIC SURGERY: ICD-10-CM

## 2019-11-14 RX ORDER — URSODIOL 500 MG/1
500 TABLET, FILM COATED ORAL DAILY
Qty: 30 TAB | Refills: 4 | Status: SHIPPED | OUTPATIENT
Start: 2019-11-14 | End: 2019-12-14

## 2019-11-14 NOTE — PROGRESS NOTES
Leo Vaughn presents today for   Chief Complaint   Patient presents with    Follow-up     Pt is here today for her follow up       Is someone accompanying this pt? no    Is the patient using any DME equipment during OV? no    Depression Screening:  3 most recent PHQ Screens 11/14/2019   Little interest or pleasure in doing things Not at all   Feeling down, depressed, irritable, or hopeless Not at all   Total Score PHQ 2 0       Learning Assessment:  Learning Assessment 11/14/2019   PRIMARY LEARNER Patient   HIGHEST LEVEL OF EDUCATION - PRIMARY LEARNER  GRADUATED HIGH SCHOOL OR GED   BARRIERS PRIMARY LEARNER NONE   CO-LEARNER CAREGIVER No   PRIMARY LANGUAGE ENGLISH    NEED No   LEARNER PREFERENCE PRIMARY DEMONSTRATION   LEARNING SPECIAL TOPICS -   ANSWERED BY patient   RELATIONSHIP SELF       Abuse Screening:  Abuse Screening Questionnaire 11/14/2019   Do you ever feel afraid of your partner? N   Are you in a relationship with someone who physically or mentally threatens you? N   Is it safe for you to go home? Y       Fall Risk  Fall Risk Assessment, last 12 mths 11/14/2019   Able to walk? Yes   Fall in past 12 months? No   Number of falls in past 12 months -         Coordination of Care:  1. Have you been to the ER, urgent care clinic since your last visit? Hospitalized since your last visit? no    2. Have you seen or consulted any other health care providers outside of the [address] [place] [address] since your last visit? Include any pap smears or colon screening.  no

## 2019-11-14 NOTE — PROGRESS NOTES
Reviewed diet progression for weeks 3-4. Patient appears to have a good understanding of the diet progression, food choices, and dietary/exercise habits for successful weight loss and nourishment after surgery. The class material included: post-op diet progression, including soft/pureed high protein low fat, low sugar food recommendations; proper food group choices. Reinforced patient following recommended food list and avoiding carbohydrates, patient introduced soft protein and cream of wheat during liquid diet phase. Reviewed safety factors related with selecting improper food choices during soft protein diet. We reviewed appropriate food choices, cooking techniques, and eating behavior modifications. Discussed intake regimen with 3 meals and 2-3 protein supplements per day. Reinforced the importance of adequate fluid with goal of 64 oz per day and adequate protein with goal of  grams per day.      Dorian Hunt RD

## 2019-11-22 ENCOUNTER — TELEPHONE (OUTPATIENT)
Dept: SURGERY | Age: 64
End: 2019-11-22

## 2019-11-22 NOTE — TELEPHONE ENCOUNTER
Patient left a VM message for RN re: challenges with hydration while on her soft, pureed diet. RN attempted to reach patient; however, had to leave a VM. RN referred her to Weeks 3-4 within her bariatric book, pages 42-44. RN specifically recommended she eat the proper portion sized and no drinking 30 minutes prior to and after eating. RN also reminded her of the schedule found on page 44. RN recommended she contact the office with further questions and/or concerns.

## 2019-11-26 ENCOUNTER — OFFICE VISIT (OUTPATIENT)
Dept: SURGERY | Age: 64
End: 2019-11-26

## 2019-11-26 VITALS
HEIGHT: 64 IN | OXYGEN SATURATION: 99 % | TEMPERATURE: 97.8 F | BODY MASS INDEX: 44.56 KG/M2 | DIASTOLIC BLOOD PRESSURE: 73 MMHG | HEART RATE: 86 BPM | WEIGHT: 261 LBS | SYSTOLIC BLOOD PRESSURE: 132 MMHG

## 2019-11-26 DIAGNOSIS — Z98.84 S/P BARIATRIC SURGERY: ICD-10-CM

## 2019-11-26 DIAGNOSIS — K90.9 INTESTINAL MALABSORPTION, UNSPECIFIED TYPE: Primary | ICD-10-CM

## 2019-11-26 DIAGNOSIS — Z98.84 S/P BARIATRIC SURGERY: Primary | ICD-10-CM

## 2019-11-26 PROBLEM — E66.01 MORBID OBESITY WITH BMI OF 45.0-49.9, ADULT (HCC): Status: RESOLVED | Noted: 2019-04-15 | Resolved: 2019-11-26

## 2019-11-26 NOTE — PROGRESS NOTES
Pt given one on one diet education. Reviewed intake guidelines for month 1 -6. We reviewed diet progression guide with portions. Appears to have a good understanding of the diet progression, food choices, and dietary/exercise habits for successful weight loss and nourishment one month after surgery. The  material included: post-op diet progression and portion sizes (including low fat, low sugar food recommendations and emphasis on protein foods and protein supplements), good beverage choices, reading a food label, vitamins/minerals required after weight loss surgery, and encouraging dietary and exercise habits that lead to weight loss success. Pt also received a restaurant card, which tells restaurants that the patient had a procedure that decreases the size of their stomach so the restaurant may let them order off the children's menu, the senior's menu, or a smaller portion for a reduced rate. Provided contact information for any further questions or concerns.      Morgan Mckay RD

## 2019-11-26 NOTE — PATIENT INSTRUCTIONS
Patient Instructions      1. Remember hydration goals - minimum of 64 ounces of liquids per day (dehydration is the number one reason for hospital readmission). 2. Sleep 7-9 hours each night to keep your metabolism up. 3. Continue to monitor carbohydrate and protein intake you need a minimum of  Grams of protein daily- remember to keep your total carbohydrates to 50 grams or less per day for best results. 4. To maximize weight loss keep your caloric intake between 800-1,200 calories daily. If you are exercising excessively, such as training for a marathon, you need to keep a food log and meet with the dietician so they can advise you on your diet choices, carbohydrate intake and caloric intake. 5. Continue to work towards exercise goals - 60-90 minutes, 5 times a week minimum of deliberate, aerobic exercise is the ultimate goal with strength training 2 times each week. Refer to [employer] for  information. 6. Remember to take vitamins as directed in your handbook. 7. Attend support group the 2nd Thursday of each month. 8. Constipation: Milk of Magnesia is for immediate relief only. Miralax is to be used every day if constipation is a chronic problem. 9. Diarrhea: patients will occasionally develop lactose intolerance after surgery. Check to see if your protein shake has whey in it. If it does try a protein powder or drink that does not have whey and stop all yogurts, cheeses and milks to see if the diarrhea goes away. 10. If you have had labs drawn. We will only call you if you have abnormal results. Otherwise you can access the lab results in \"[employer]\". You will only need the access code the first time you sign on. 11. Call us at [phone] or email us through [facility] with questions,     concerns or worsening of condition, we have someone on call 24 hours a day.   If you are unable to reach our office, you are to go to your Primary Care Physician or the Emergency Department. NOTE TO GASTRIC BYPASS PATIENTS:  (SAME APPLIES TO GASTRIC SLEEVE PATIENTS FOR FIRST TWO MONTHS)  Remember that for the rest of your life, you are not able to take the following:  - NSAIDs (ibuprofen, goody powder, BC powder, Motrin, Advil, Mobic, Voltaren, Excedrin, etc.)  - Steroid pills or injections  - Smoke (cigarettes or recreational drugs)  - Alcohol  Use of any of the above may cause ulcers in your stomach which may perforate causing a medical emergency and surgery. Speak to our medical staff if another medical provider requires you to take steroids or NSAIDs. Supplement Resource Guide    Importance of Protein:   Maintains lean body mass, produces antibodies to fight off infections, heals wounds, minimizes hair loss, helps to give you energy, helps with satiety, and keeping you full between meals. Importance of Calcium:  Needed for healthy bones and teeth, normal blood clotting, and nervous system functioning, higher risk of osteoporosis and bone disease with non-compliance. Importance of Multivitamins: Many functions. Supply you with extra nutrients that you may be missing from food. May lead to iron deficiency anemia, weakness, fatigue, and many other symptoms with non-compliance. Importance of B Vitamins:  Important for red blood cell formation, metabolism, energy, and helps to maintain a healthy nervous system. Protein Supplement  Liquid diet phase: consume 90-100g protein daily. Once you are eating consume  35-50g protein each day from your protein supplement. 0-3 g fat per serving  0-3 g sugar per serving    The body can only absorb 30g of protein at one time, so do not consume more than that at one time. Multi-vitamin Supplement:    Start immediately after surgery: any complete chewable, such as: Abramss Complete chewables. Avoid Abrams sours or gummies.   They lack iron and other important nutrients and also have added sugar. Continue with a chewable vitamin or change to an adult complete multivitamin one month after surgery. Menstruating women can take a prenatal vitamin. Make sure it has at least 18 mg iron and 053-050 mcg folic acid Calcium Supplement:     Start taking within one month after surgery. Look for:   Calcium Citrate Plus D (1500 mg per day)    Recommend: Citracal    Avoid chocolate chewable calcium. Can use chewable bariatric or [employer] brand or similar chewable. The body cannot absorb more than 500-600 mg of calcium at one time. Take for Life    Vitamin D  Take 3,000 international units daily Vitamin B12  B Complex Vitamin  Start taking both within one month after surgery. Vitamin B12 (sublingual): Take 1000 mcg of Vitamin B12 three times weekly    Must take sublingually (meaning you put it under your tongue) or in a liquid drop form for easy absorption. B Complex Vitamin:   Take one pill daily or liquid drop form daily; as directed on bottle.      Take for Life

## 2019-11-26 NOTE — PROGRESS NOTES
Subjective:      Candelaria Lorenz is a 75 y.o. female is now 1 months status post laparoscopic gastric bypass surgery. Doing well overall. She has lost a total of 20 pounds since surgery. Body mass index is 44.8 kg/m². Has lost 13% of EBW. Currently on a soft food diet without difficulty, reports no issues and denies vomiting and abdominal pain. Taking in 60oz water daily. Sources of protein include yogurt and protein shakes. She has made soup and put mashed potatoes in the base, she has had some other carbohydrates as well. She has been doing some walking, but she states she needs to exercise more. She also has stairs in her house which she has been using. Patient is sleeping 8 hours a night on average. Bowel movements are constipated, she uses Metamucil when needed. The patient is not having any pain. . The patient is compliant with multivitamins.      Weight Loss Metrics 11/26/2019 11/14/2019 11/14/2019 10/29/2019 10/21/2019 10/17/2019 8/28/2019   Today's Wt 261 lb 264 lb 264 lb 11.2 oz 279 lb 2 oz 281 lb 282 lb 276 lb   BMI 44.8 kg/m2 45.32 kg/m2 45.44 kg/m2 47.91 kg/m2 48.23 kg/m2 48.41 kg/m2 47.38 kg/m2          Comorbidities:    Hypertension: improved, off medications  Diabetes: improved, off medications, has stopped checking blood glucose levels  Obstructive Sleep Apnea: not applicable  Hyperlipidemia: not applicable  Stress Urinary Incontinence: improved  Gastroesophageal Reflux: improved, on PPI  Weight related arthropathy:improved, bilateral knee pain     Patient Active Problem List   Diagnosis Code    [facility]bid [facility] E66.01    Hypertension I10    Hyperlipidemia E78.5    Asthma J45.909    Diabetes ([facility] E11.9    GERD (gastroesophageal reflux disease) K21.9    Morbid obesity with BMI of 45.0-49.9, adult ([facility] E66.01, Z68.42    Hypothyroid E03.9    Low back pain M54.5    Arthritis of knee M17.10    Morbid obesity with body mass index (BMI) of 40.0 to 49.9 ([facility]) E66.01    Steatohepatitis K75.81    Intestinal malabsorption K90.9    S/P gastric bypass Z98.84        Past Medical History:   Diagnosis Date    Arthritis of knee     generalized    Asthma     seasonal    Diabetes ([address]     oral medications    GERD (gastroesophageal reflux disease)     Hyperlipidemia     Hypertension     situational    Hypothyroid     Intestinal malabsorption 11/8/2019    Low back pain     Pituitary tumor     adenoma    S/P gastric bypass 11/8/2019    10/29/19 by Dr. Hoang Nurse Steatohepatitis 11/8/2019       Past Surgical History:   Procedure Laterality Date    ABDOMEN SURGERY PROC UNLISTED      abdominoplasty    HX HYSTERECTOMY      HX ORTHOPAEDIC Right     Sx due to broken ankle- screw placed    HX OTHER SURGICAL  01/28/2019    Pituitary tumor resection, Dr. Sergio Shell      pituitary surgery for adenoma       Current Outpatient Medications   Medication Sig Dispense Refill    multivitamin with iron (FLINTSTONES) chewable tablet Take 1 Tab by mouth daily.  B.infantis-B.ani-B.long-B.bifi (PROBIOTIC 4X) 10-15 mg TbEC Take  by mouth.  ursodiol (TJ FORTE) 500 mg tablet Take 1 Tab by mouth daily for 30 days. 30 Tab 4    omeprazole (PRILOSEC) 20 mg capsule Take 1 Cap by mouth daily. 30 Cap 2    VENTOLIN HFA 90 mcg/actuation inhaler INHALE 2 PUFFS BY MOUTH EVERY 6 HOURS  1    cetirizine (ZYRTEC) 10 mg tablet Take 10 mg by mouth daily as needed.   6       No Known Allergies    Review of Systems:  General - No history or complaints of unexpected fever or chills  Head/Neck - No history or complaints of headache or dizziness  Cardiac - No history or complaints of chest pain, palpitations, or shortness of breath  Pulmonary - No history or complaints of shortness of breath or productive cough  Gastrointestinal - as noted above  Genitourinary - No history or complaints of hematuria/dysuria or renal lithiasis  Musculoskeletal - No history or complaints of joint  muscular weakness  Hematologic - No history of any bleeding episodes  Neurologic - No history or complaints of  migraine headaches or neurologic symptoms    Objective:     Visit Vitals  /73 (BP 1 Location: Left arm, BP Patient Position: Sitting)   Pulse 86   Temp 97.8 °F (36.6 °C)   Ht 5' 4\" (1.626 m)   Wt 118.4 kg (261 lb)   SpO2 99%   BMI 44.80 kg/m²       General:  alert, cooperative, no distress, appears stated age   Chest: lungs clear to auscultation, breath sounds equal and symmetric, no rhonchi, rales or wheezes, no accessory muscle use   Cor:   Regular rate and rhythm or without murmur or extra heart sounds   Abdomen: soft, bowel sounds active, non-tender, no masses or organomegaly   Incisions:   healing well, no drainage, no erythema, no hernia, no seroma, no swelling, no dehiscence, incision well approximated       Recent Results (from the past 2016 hour(s))   EKG, 12 LEAD, INITIAL    Collection Time: 10/24/19  1:01 PM   Result Value Ref Range    Ventricular Rate 76 BPM    Atrial Rate 76 BPM    P-R Interval 156 ms    QRS Duration 72 ms    Q-T Interval 344 ms    QTC Calculation (Bezet) 387 ms    Calculated P Axis 52 degrees    Calculated R Axis 40 degrees    Calculated T Axis 9 degrees    Diagnosis       Normal sinus rhythm with sinus arrhythmia  Nonspecific ST and T wave abnormality  Abnormal ECG  Confirmed by Pacheco Merino MD, RUST ([id number]) on 10/25/2019 3:06:27 AM     CBC WITH AUTOMATED DIFF    Collection Time: 10/24/19  1:11 PM   Result Value Ref Range    WBC 6.5 4.6 - 13.2 K/uL    RBC 5.21 4.20 - 5.30 M/uL    HGB 13.4 12.0 - 16.0 g/dL    HCT 42.7 35.0 - 45.0 %    MCV 82.0 74.0 - 97.0 FL    MCH 25.7 24.0 - 34.0 PG    MCHC 31.4 31.0 - 37.0 g/dL    RDW 14.4 11.6 - 14.5 %    PLATELET 472 621 - 830 K/uL    MPV 10.1 9.2 - 11.8 FL    NEUTROPHILS 49 40 - 73 %    LYMPHOCYTES 42 21 - 52 %    MONOCYTES 7 3 - 10 %    EOSINOPHILS 1 0 - 5 %    BASOPHILS 1 0 - 2 %    ABS.  NEUTROPHILS 3.2 1.8 - 8.0 K/UL    ABS. LYMPHOCYTES 2.7 0.9 - 3.6 K/UL    ABS. MONOCYTES 0.5 0.05 - 1.2 K/UL    ABS. EOSINOPHILS 0.1 0.0 - 0.4 K/UL    ABS. BASOPHILS 0.0 0.0 - 0.1 K/UL    DF AUTOMATED     METABOLIC PANEL, COMPREHENSIVE    Collection Time: 10/24/19  1:11 PM   Result Value Ref Range    Sodium 138 136 - 145 mmol/L    Potassium 4.4 3.5 - 5.5 mmol/L    Chloride 103 100 - 111 mmol/L    CO2 30 21 - 32 mmol/L    Anion gap 5 3.0 - 18 mmol/L    Glucose 105 (H) 74 - 99 mg/dL    BUN 14 7.0 - 18 MG/DL    Creatinine 0.93 0.6 - 1.3 MG/DL    BUN/Creatinine ratio 15 12 - 20      GFR est AA >60 >60 ml/min/1.73m2    GFR est non-AA >60 >60 ml/min/1.73m2    Calcium 9.5 8.5 - 10.1 MG/DL    Bilirubin, total 0.3 0.2 - 1.0 MG/DL    ALT (SGPT) 23 13 - 56 U/L    AST (SGOT) 12 10 - 38 U/L    Alk.  phosphatase 157 (H) 45 - 117 U/L    Protein, total 7.3 6.4 - 8.2 g/dL    Albumin 3.3 (L) 3.4 - 5.0 g/dL    Globulin 4.0 2.0 - 4.0 g/dL    A-G Ratio 0.8 0.8 - 1.7     HEMOGLOBIN A1C WITH EAG    Collection Time: 10/24/19  1:11 PM   Result Value Ref Range    Hemoglobin A1c 6.2 (H) 4.2 - 5.6 %    Est. average glucose 131 mg/dL   TYPE & SCREEN    Collection Time: 10/24/19  1:12 PM   Result Value Ref Range    Crossmatch Expiration 11/01/2019     ABO/Rh(D) B POSITIVE     Antibody screen NEG    GLUCOSE, POC    Collection Time: 10/29/19  6:16 AM   Result Value Ref Range    Glucose (POC) 98 70 - 110 mg/dL   GLUCOSE, POC    Collection Time: 10/29/19  9:43 AM   Result Value Ref Range    Glucose (POC) 136 (H) 70 - 110 mg/dL   GLUCOSE, POC    Collection Time: 10/29/19  1:13 PM   Result Value Ref Range    Glucose (POC) 133 (H) 70 - 110 mg/dL   GLUCOSE, POC    Collection Time: 10/29/19  6:45 PM   Result Value Ref Range    Glucose (POC) 138 (H) 70 - 110 mg/dL   GLUCOSE, POC    Collection Time: 10/29/19 11:48 PM   Result Value Ref Range    Glucose (POC) 115 (H) 70 - 110 mg/dL   GLUCOSE, POC    Collection Time: 10/30/19  6:24 AM   Result Value Ref Range    Glucose (POC) 119 (H) 70 - 110 mg/dL   GLUCOSE, POC    Collection Time: 10/30/19 11:24 AM   Result Value Ref Range    Glucose (POC) 138 (H) 70 - 110 mg/dL       Assessment:   History of Morbid obesity, status post laparoscopic gastric bypass surgery. Doing well postoperatively. Exercise a minimum of 30 minutes daily. Strict diet control, patient is to keep carbohydrate consumption <50g daily for additional weight loss. HTN - f/u w/ PCP  DM - monitor blood glucose levels and f/u PCP  HEATHER - will consider referral to [employer] for Pelvic Floor training in the future  GERD - continue PPI  Asthma - Continue meds and follow up with PCP    Plan:     1. Increase activity to the goal of 30 minutes daily   2. Start 500mg [employer] today, stop after 6 months out from surgery. 3. Pt is cleared to return to work without restrictions. 4. Can stop probiotic    5. Sleep goal is 7-9 hours a night. Patient education given on the effects of sleep deprivation on weight control. 6. Advance diet to solid phase. Reminded to measure portions, continue high protein, low carbohydrate diet. Reminded to eat regularly, to eat slowly & not to drink with meals. Refer to the handbook given in class. 7. Patient has appt with dietician directly after this visit. 8. Continue multivitamin and add the additional vitamin supplementation (Ca, B complex, B12, D, all listed in handbook)  9. Continue current medications and follow up with PCP for management of regimen. 10. Continue cardio exercise and add resistance exercises. Discussed with patient. Minimum of 30 minutes of exercise daily. 11. Encouraged to attend support group   12. I have discussed this plan with patient and they verbalized understanding  13. Follow up in 1 months or sooner if patient has questions, concerns or worsening of condition, if unable to reach our office, patient should report to the ED. 15. Ms. Mukul Tang has a reminder for a \"due or due soon\" health maintenance.  I have asked that she contact her primary care provider for a follow-up on this health maintenance.

## 2020-01-02 ENCOUNTER — OFFICE VISIT (OUTPATIENT)
Dept: SURGERY | Age: 65
End: 2020-01-02

## 2020-01-02 VITALS
TEMPERATURE: 97.2 F | HEART RATE: 95 BPM | BODY MASS INDEX: 41.54 KG/M2 | DIASTOLIC BLOOD PRESSURE: 82 MMHG | OXYGEN SATURATION: 100 % | WEIGHT: 243.3 LBS | SYSTOLIC BLOOD PRESSURE: 127 MMHG | HEIGHT: 64 IN

## 2020-01-02 DIAGNOSIS — K90.9 INTESTINAL MALABSORPTION, UNSPECIFIED TYPE: Primary | ICD-10-CM

## 2020-01-02 DIAGNOSIS — Z98.84 S/P BARIATRIC SURGERY: ICD-10-CM

## 2020-01-02 NOTE — PROGRESS NOTES
1. Have you been to the ER, urgent care clinic since your last visit? Hospitalized since your last visit? No    2. Have you seen or consulted any other health care providers outside of the [address] [place] [address] since your last visit? Include any pap smears or colon screening.  No        Chief Complaint   Patient presents with    Follow-up

## 2020-01-02 NOTE — PROGRESS NOTES
Subjective:      Elina Wilson is a 75 y.o. female is now 2 month status post laparoscopic gastric bypass surgery. Doing well overall. She has lost a total of 38 pounds since surgery. Body mass index is 41.76 kg/m². Has lost 25% of EBW. Currently on a solid food diet without difficulty, reports no issues and denies vomiting and abdominal pain. Taking in 90+oz water daily. Sources of protein include cottage cheese, salmon and protein shakes. 90 min of activity 4 days a week, including walking and going to the gym. Patient is sleeping 8 hours a night on average. Bowel movements are regular. The patient is not having any pain. . The patient is compliant with multivitamins, calcium, Vit D and B12 supplements.      Weight Loss Metrics 1/2/2020 11/26/2019 11/14/2019 11/14/2019 10/29/2019 10/21/2019 10/17/2019   Today's Wt 243 lb 4.8 oz 261 lb 264 lb 264 lb 11.2 oz 279 lb 2 oz 281 lb 282 lb   BMI 41.76 kg/m2 44.8 kg/m2 45.32 kg/m2 45.44 kg/m2 47.91 kg/m2 48.23 kg/m2 48.41 kg/m2          Comorbidities:    Hypertension: improved, off medications  Diabetes: improved, off medications, blood glucose levels run 86-95  Obstructive Sleep Apnea: not applicable  Hyperlipidemia: not applicable  Stress Urinary Incontinence: improved  Gastroesophageal Reflux: improved, on PPI  Weight related arthropathy:improved, bilateral knee pain     Patient Active Problem List   Diagnosis Code    Obesity, morbid ([facility]) E66.01    Hyperlipidemia E78.5    Asthma J45.909    Diabetes ([facility] E11.9    GERD (gastroesophageal reflux disease) K21.9    Hypothyroid E03.9    Low back pain M54.5    Arthritis of knee M17.10    Morbid obesity with body mass index (BMI) of 40.0 to 49.9 ([facility]) E66.01    Steatohepatitis K75.81    Intestinal malabsorption K90.9    S/P gastric bypass Z98.84        Past Medical History:   Diagnosis Date    Arthritis of knee     generalized    Asthma     seasonal    Diabetes ([facility]     oral medications    GERD (gastroesophageal reflux disease)     Hyperlipidemia     Hypertension     situational    Hypothyroid     Intestinal malabsorption 11/8/2019    Low back pain     Pituitary tumor     adenoma    S/P gastric bypass 11/8/2019    10/29/19 by Dr. Stefani Kovacs Steatohepatitis 11/8/2019       Past Surgical History:   Procedure Laterality Date    ABDOMEN SURGERY PROC UNLISTED      abdominoplasty    HX HYSTERECTOMY      HX ORTHOPAEDIC Right     Sx due to broken ankle- screw placed    HX OTHER SURGICAL  01/28/2019    Pituitary tumor resection, Dr. Ilana Posada      pituitary surgery for adenoma       Current Outpatient Medications   Medication Sig Dispense Refill    multivitamin with iron (FLINTSTONES) chewable tablet Take 1 Tab by mouth daily.  B.infantis-B.ani-B.long-B.bifi (PROBIOTIC 4X) 10-15 mg TbEC Take  by mouth.  omeprazole (PRILOSEC) 20 mg capsule Take 1 Cap by mouth daily. 30 Cap 2    VENTOLIN HFA 90 mcg/actuation inhaler INHALE 2 PUFFS BY MOUTH EVERY 6 HOURS  1    cetirizine (ZYRTEC) 10 mg tablet Take 10 mg by mouth daily as needed.   6       No Known Allergies    Review of Systems:  General - No history or complaints of unexpected fever or chills  Head/Neck - No history or complaints of headache or dizziness  Cardiac - No history or complaints of chest pain, palpitations, or shortness of breath  Pulmonary - No history or complaints of shortness of breath or productive cough  Gastrointestinal - as noted above  Genitourinary - No history or complaints of hematuria/dysuria or renal lithiasis  Musculoskeletal - No history or complaints of joint  muscular weakness  Hematologic - No history of any bleeding episodes  Neurologic - No history or complaints of  migraine headaches or neurologic symptoms    Objective:     Visit Vitals  /82 (BP 1 Location: Left arm, BP Patient Position: Sitting)   Pulse 95   Temp 97.2 °F (36.2 °C)   Ht 5' 4\" (1.626 m)   Wt 110.4 kg (243 lb 4.8 oz)   SpO2 100%   BMI 41.76 kg/m²       General:  alert, cooperative, no distress, appears stated age   Chest: lungs clear to auscultation, breath sounds equal and symmetric, no rhonchi, rales or wheezes, no accessory muscle use   Cor:   Regular rate and rhythm or without murmur or extra heart sounds   Abdomen: soft, bowel sounds active, non-tender, no masses or organomegaly   Incisions:   healed well       Assessment:   History of Morbid obesity, status post laparoscopic gastric bypass surgery. Doing well postoperatively. HTN - f/u with PCP  DM - continue to monitor blood glucose levels and f/u with PCP  GERD - continue PPI prn  HEATHER   Asthma - Continue meds and follow up with PCP    Plan:     1. Increase activity to the goal of 30 minutes daily  2. Discussed patients weight loss goals and dietary choices in relation to goals. 3. Sleep goal is 7-9 hours each night. Patient education given on the effects of sleep deprivation  4. Reminded to measure portions, continue high protein, low carbohydrate diet. Reminded to eat regularly, to eat slowly & not to drink with meals. 5. Continue vitamin supplementation  6. Continue current medications and follow up with PCP for management of regimen. 7. Continue cardio exercise and add resistance exercises. 60-90 minutes of aerobic activity 5 days a week and strength training 2 days each week. 8. Encouraged to attend support group   9. I have discussed this plan with patient and they verbalized understanding  10. Follow up in 2 months or sooner if patient has questions, concerns or worsening of condition, if unable to reach our office, patient should report to the ED. 6. Ms. Valdemar Heaton has a reminder for a \"due or due soon\" health maintenance. I have asked that she contact her primary care provider for a follow-up on this health maintenance.

## 2020-01-02 NOTE — PATIENT INSTRUCTIONS
Patient Instructions      1. Remember hydration goals - minimum of 64 ounces of liquids per day (dehydration is the number one reason for hospital readmission). 2. Sleep 7-9 hours each night to keep your metabolism up. 3. Continue to monitor carbohydrate and protein intake you need a minimum of  Grams of protein daily- remember to keep your total carbohydrates to 50 grams or less per day for best results. 4. To maximize weight loss keep your caloric intake between 800-1,200 calories daily. If you are exercising excessively, such as training for a marathon, you need to keep a food log and meet with the dietician so they can advise you on your diet choices, carbohydrate intake and caloric intake. 5. Continue to work towards exercise goals - 60-90 minutes, 5 times a week minimum of deliberate, aerobic exercise is the ultimate goal with strength training 2 times each week. Refer to [employer] for  information. 6. Remember to take vitamins as directed in your handbook. 7. Attend support group the 2nd Thursday of each month. 8. Constipation: Milk of Magnesia is for immediate relief only. Miralax is to be used every day if constipation is a chronic problem. 9. Diarrhea: patients will occasionally develop lactose intolerance after surgery. Check to see if your protein shake has whey in it. If it does try a protein powder or drink that does not have whey and stop all yogurts, cheeses and milks to see if the diarrhea goes away. 10. If you have had labs drawn. We will only call you if you have abnormal results. Otherwise you can access the lab results in \"[employer]\". You will only need the access code the first time you sign on. 11. Call us at [phone] or email us through [email or web address]" with questions,     concerns or worsening of condition, we have someone on call 24 hours a day.   If you are unable to reach our office, you are to go to your Primary Care Physician or the Emergency Department. NOTE TO GASTRIC BYPASS PATIENTS:  (SAME APPLIES TO GASTRIC SLEEVE PATIENTS FOR FIRST TWO MONTHS)  Remember that for the rest of your life, you are not able to take the following:  - NSAIDs (ibuprofen, goody powder, BC powder, Motrin, Advil, Mobic, Voltaren, Excedrin, etc.)  - Steroid pills or injections  - Smoke (cigarettes or recreational drugs)  - Alcohol  Use of any of the above may cause ulcers in your stomach which may perforate causing a medical emergency and surgery. Speak to our medical staff if another medical provider requires you to take steroids or NSAIDs. Supplement Resource Guide    Importance of Protein:   Maintains lean body mass, produces antibodies to fight off infections, heals wounds, minimizes hair loss, helps to give you energy, helps with satiety, and keeping you full between meals. Importance of Calcium:  Needed for healthy bones and teeth, normal blood clotting, and nervous system functioning, higher risk of osteoporosis and bone disease with non-compliance. Importance of Multivitamins: Many functions. Supply you with extra nutrients that you may be missing from food. May lead to iron deficiency anemia, weakness, fatigue, and many other symptoms with non-compliance. Importance of B Vitamins:  Important for red blood cell formation, metabolism, energy, and helps to maintain a healthy nervous system. Protein Supplement  Liquid diet phase: consume 90-100g protein daily. Once you are eating consume  35-50g protein each day from your protein supplement. 0-3 g fat per serving  0-3 g sugar per serving    The body can only absorb 30g of protein at one time, so do not consume more than that at one time. Multi-vitamin Supplement:    Start immediately after surgery: any complete chewable, such as: [employer]s Complete chewables. Avoid [employer] sours or gummies.   They lack iron and other important nutrients and also have added sugar. Continue with a chewable vitamin or change to an adult complete multivitamin one month after surgery. Menstruating women can take a prenatal vitamin. Make sure it has at least 18 mg iron and 580-212 mcg folic acid Calcium Supplement:     Start taking within one month after surgery. Look for:   Calcium Citrate Plus D (1500 mg per day)    Recommend: Citracal    Avoid chocolate chewable calcium. Can use chewable bariatric or GNC brand or similar chewable. The body cannot absorb more than 500-600 mg of calcium at one time. Take for Life    Vitamin D  Take 3,000 international units daily Vitamin B12  B Complex Vitamin  Start taking both within one month after surgery. Vitamin B12 (sublingual): Take 1000 mcg of Vitamin B12 three times weekly    Must take sublingually (meaning you put it under your tongue) or in a liquid drop form for easy absorption. B Complex Vitamin:   Take one pill daily or liquid drop form daily; as directed on bottle.      Take for Life

## 2020-01-19 RX ORDER — OMEPRAZOLE 20 MG/1
CAPSULE, DELAYED RELEASE ORAL
Qty: 90 CAP | Refills: 0 | Status: SHIPPED | OUTPATIENT
Start: 2020-01-19 | End: 2020-04-14

## 2020-03-27 ENCOUNTER — VIRTUAL VISIT (OUTPATIENT)
Dept: SURGERY | Age: 65
End: 2020-03-27

## 2020-03-27 VITALS — WEIGHT: 225 LBS | BODY MASS INDEX: 38.41 KG/M2 | HEIGHT: 64 IN

## 2020-03-27 DIAGNOSIS — K90.9 INTESTINAL MALABSORPTION, UNSPECIFIED TYPE: Primary | ICD-10-CM

## 2020-03-27 DIAGNOSIS — Z98.84 S/P BARIATRIC SURGERY: ICD-10-CM

## 2020-03-27 NOTE — PROGRESS NOTES
Subjective:     Pilo Moreno  is a 75 y.o. female who presents for follow-up about 5 months following laparoscopic gastric bypass surgery. She has lost a total of 56 pounds since surgery. Body mass index is 38.62 kg/m². . EBWL is (38%). The patient presents today to assess their progress toward their goal of weight loss and to address any issues that may be present. Today the patient and I have reviewed their diet and how appropriate their food choices are. The following issues have been identified : no new issues noted. The patient has been seeing her family physician and has been removed from all of her diabetic medications, antihypertensive medications, and cholesterol medications. Both her and her family physician are very pleased with her progress to date. Weight Loss Metrics 3/27/2020 1/2/2020 11/26/2019 11/14/2019 11/14/2019 10/29/2019 10/21/2019   Today's Wt 225 lb 243 lb 4.8 oz 261 lb 264 lb 264 lb 11.2 oz 279 lb 2 oz 281 lb   BMI 38.62 kg/m2 41.76 kg/m2 44.8 kg/m2 45.32 kg/m2 45.44 kg/m2 47.91 kg/m2 48.23 kg/m2     . Surgery related complication: none   none    She reports no issues and denies vomiting and abdominal pain. The patients diet choices have been reviewed today and are  Good  Patients pain score:0      The patient's exercise level: moderately active. Changes in her medical history and medications have been reviewed.     Patient Active Problem List   Diagnosis Code    [facility] E66.01    Hyperlipidemia E78.5    Asthma J45.909    Diabetes ([facility] E11.9    GERD (gastroesophageal reflux disease) K21.9    Hypothyroid E03.9    Low back pain M54.5    Arthritis of knee M17.10    Morbid obesity with body mass index (BMI) of 40.0 to 49.9 ([facility]) E66.01    Steatohepatitis K75.81    Intestinal malabsorption K90.9    S/P gastric bypass Z98.84     Past Medical History:   Diagnosis Date    Arthritis of knee     generalized    Asthma     seasonal    Diabetes ([facility] oral medications    GERD (gastroesophageal reflux disease)     Hyperlipidemia     Hypertension     situational    Hypothyroid     Intestinal malabsorption 11/8/2019    Low back pain     Pituitary tumor     adenoma    S/P gastric bypass 11/8/2019    10/29/19 by Dr. Olinda Hoskins Steatohepatitis 11/8/2019     Past Surgical History:   Procedure Laterality Date    ABDOMEN SURGERY PROC UNLISTED      abdominoplasty    HX GI  10/2019    gastric bypass / Chiqui Aguayo    HX HYSTERECTOMY      HX ORTHOPAEDIC Right     Sx due to broken ankle- screw placed    HX OTHER SURGICAL  01/28/2019    Pituitary tumor resection, Dr. Keen Home HX TONSILLECTOMY       Current Outpatient Medications   Medication Sig Dispense Refill    omeprazole (PRILOSEC) 20 mg capsule TAKE ONE CAPSULE BY MOUTH DAILY 90 Cap 0    multivitamin with iron (FLINTSTONES) chewable tablet Take 1 Tab by mouth daily.  B.infantis-B.ani-B.long-B.bifi (PROBIOTIC 4X) 10-15 mg TbEC Take  by mouth.  VENTOLIN HFA 90 mcg/actuation inhaler INHALE 2 PUFFS BY MOUTH EVERY 6 HOURS  1    cetirizine (ZYRTEC) 10 mg tablet Take 10 mg by mouth daily as needed.   6        Review of Symptoms:       General - No history or complaints of unexpected fever or chills  Head/Neck - No history or complaints of headache or dizziness  Cardiac - No history or complaints of chest pain, palpitations, or shortness of breath  Pulmonary - No history or complaints of shortness of breath or productive cough  Gastrointestinal - as noted above  Genitourinary - No history or complaints of hematuria/dysuria or renal lithiasis  Musculoskeletal - No history or complaints of joint  muscular weakness  Hematologic - No history of any bleeding episodes  Neurologic - No history or complaints of  migraine headaches or neurologic symptoms                     Objective:     Visit Vitals  Ht 5' 4\" (1.626 m)   Wt 102.1 kg (225 lb)   BMI 38.62 kg/m²        Physical Exam:      Physical Examination: General appearance - alert, well appearing, and in no distress and oriented to person, place, and time  Mental status - alert, oriented to person, place, and time, normal mood, behavior, speech, dress, motor activity, and thought processes  Eyes - pupils equal and reactive, extraocular eye movements intact, sclera anicteric, left eye normal, right eye normal  Ears - right ear normal, left ear normal  Nose - normal and patent, no erythema or discharge  Chest - good air movement  Heart - N/A  Abdomen - no obvious distension, scars as noted:   Neurological - alert, oriented, normal speech, no focal findings or movement disorder noted  Musculoskeletal - no swelling noted  Extremities - normal movement      Lab Results   Component Value Date/Time    WBC 6.5 10/24/2019 01:11 PM    HGB 13.4 10/24/2019 01:11 PM    HCT 42.7 10/24/2019 01:11 PM    PLATELET 718 56/35/0449 01:11 PM    MCV 82.0 10/24/2019 01:11 PM     Lab Results   Component Value Date/Time    Sodium 138 10/24/2019 01:11 PM    Potassium 4.4 10/24/2019 01:11 PM    Chloride 103 10/24/2019 01:11 PM    CO2 30 10/24/2019 01:11 PM    Anion gap 5 10/24/2019 01:11 PM    Glucose 105 (H) 10/24/2019 01:11 PM    BUN 14 10/24/2019 01:11 PM    Creatinine 0.93 10/24/2019 01:11 PM    BUN/Creatinine ratio 15 10/24/2019 01:11 PM    GFR est AA >60 10/24/2019 01:11 PM    GFR est non-AA >60 10/24/2019 01:11 PM    Calcium 9.5 10/24/2019 01:11 PM    Bilirubin, total 0.3 10/24/2019 01:11 PM    AST (SGOT) 12 10/24/2019 01:11 PM    Alk. phosphatase 157 (H) 10/24/2019 01:11 PM    Protein, total 7.3 10/24/2019 01:11 PM    Albumin 3.3 (L) 10/24/2019 01:11 PM    Globulin 4.0 10/24/2019 01:11 PM    A-G Ratio 0.8 10/24/2019 01:11 PM    ALT (SGPT) 23 10/24/2019 01:11 PM     No results found for: IRON, FE, TIBC, IBCT, PSAT, FERR  No results found for: FOL, RBCF  No results found for: VITD3, Kelly Carney, XQVID, VD3RIA          Assessment:     1.  History of Morbid obesity, status post laparoscopic gastric bypass surgery. Patient does have fairly significant loose skin and rash particular she states on her upper thighs. She has inquired as to whether or not I feel like Medicare would cover any type of plastic surgical procedure. I expressed to her that I have heard it can be very problematic to get something such as that would not be covered. I have also given her the advice that simply opening up a savings account and placing some money in it weekly or monthly would be a good idea in case she feels like she needs something done in the future. He does state that her abdominal plasty seems to be still intact and she has no problem with overhanging pannus on her abdominal wall. 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 I have expressed to her that patients who have that type of schedule sometimes have poor weight loss and she will try to go to bed at a regular hour and wake up at a normal hour the day during her active metabolicperiod. Plan:     1. Remember to measure portions, continue low carbohydrate diet  2. Continue to concentrate on protein intake meeting daily requirements  3. Remember vitamin supplements. The importance of such was discussed regarding the malabsorptive issues that the surgery creates. 4. Exercise regimen appears to be: fairly good  5. Try and attend support group if feasible. 6. Follow-up in 2 month(s). 7. Lab ordered for next visit  8. Total time spent with the patient 30 minutes. This visit with Ludivina Mcmanus was performed under virtual telemedicine guidelines during the coronavirus (DXDBW-60) public health emergency on 3/27/2020 in an interactive   fashion using [employer].   They understand that this telemedicine encounter is a billable service, with coverage determined by their insurance carrier. They are aware that   they may receive a bill and have provided verbal consent for this virtual visit. This visit was performed with the patient in their home environment and provider was   present at [facility]. I have spent over 30 minutes on this visit  both prior to the visits reviewing the patients chart and with the patient face to face. I have reviewed their medical history, performed a telemedicine physical examination, and discussed the plan of action to date. They understand that they will be asked   to come to the office when our office is allowed normal patient interaction, as dictated by public health officials, for a face-to-face visit to rediscuss all of the things we  have talked about today. During this visit we discussed the varieties of surgeries that we perform, how they would impact the patient from a weight loss standpoint   considering their medical issues and prior surgeries, and also the restrictions that the patient would have long-term with the operation that they have chosen.     Rosalio Charlton M.D.  4/9/2020

## 2020-03-27 NOTE — PATIENT INSTRUCTIONS
Patient Instructions 1. Continue to monitor carbohydrate and protein intake- remember to keep your           total  carbohydrates to 50 grams or less per day for best results. 2. Remember hydration goals - usually 48 to 64 ounces of liquids per day 3. Continue to work towards exercise goals - minimum 3 days per week of 45          minutes to  1 hour at a time. 4. Remember to take vitamins as directed Supplement Resource Guide Importance of Protein:  
Maintains lean body mass, produces antibodies to fight off infections, heals wounds, minimizes hair loss, helps to give you energy, helps with satiety, and keeping you full between meals. Importance of Calcium: 
Needed for healthy bones and teeth, normal blood clotting, and nervous system functioning, higher risk of osteoporosis and bone disease with non-compliance. Importance of Multivitamins: Many functions. Supply you with extra nutrients that you may be missing from food. May lead to iron deficiency anemia, weakness, fatigue, and many other symptoms with non-compliance. Importance of B Vitamins: 
Important for red blood cell formation, metabolism, energy, and helps to maintain a healthy nervous system. Protein Supplement Find one you like now. Use immediately after surgery. Look for: 
35-50g protein each day from your protein supplement once you reach the progression diet. 0-3 g fat per serving 0-3 g sugar per serving Protein drinks should be split in separate dosages. Recommend: Lifelong 1 year + Calcium Supplement:  
 
Start taking within a month after surgery. Look for: Calcium Citrate Plus D (1500 mg per day) Recommend: Citracal 
 
 . Avoid chocolate chewable calcium. Can use chewable bariatric or GNC brand or similar chewable. The body cannot absorb more than 500-600 mg @ a time. Take for Life Multi-vitamin Supplement: 1st Month After Surgery: Any complete chewable, such as: [employer]s Complete chewables. Avoid [employer] sours or gummies. They lack iron and other important nutrients and also have added sugar. Continue with chewable vitamin or change to adult complete multivitamin one month after surgery. Menstruating women can take a prenatal vitamin. Make sure has at least 18 mg iron and 795-541 mcg folic acid): Vitamin B12, B Complex Vitamin, and Biotin Start taking within a month after surgery. Vitamin B12:  1000 mcg of Vitamin B12 three times weekly Must take sublingually (meaning you take it under your tongue) or in a liquid drop form for easy absorption. B Complex Vitamin: Take a pill or liquid drop form once daily. Biotin: This vitamin can help prevent hair loss. Recommend 5mg  
(5000 mcg) a day Biotin is Optional

## 2020-04-14 RX ORDER — OMEPRAZOLE 20 MG/1
CAPSULE, DELAYED RELEASE ORAL
Qty: 90 CAP | Refills: 0 | Status: SHIPPED | OUTPATIENT
Start: 2020-04-14 | End: 2020-07-20 | Stop reason: SDUPTHER

## 2020-07-20 RX ORDER — OMEPRAZOLE 20 MG/1
CAPSULE, DELAYED RELEASE ORAL
Qty: 90 CAP | Refills: 0 | Status: SHIPPED | OUTPATIENT
Start: 2020-07-20 | End: 2020-09-05 | Stop reason: SDUPTHER

## 2020-09-05 RX ORDER — OMEPRAZOLE 20 MG/1
CAPSULE, DELAYED RELEASE ORAL
Qty: 90 CAP | Refills: 0 | Status: SHIPPED | OUTPATIENT
Start: 2020-09-05 | End: 2020-10-16

## 2020-09-25 ENCOUNTER — DOCUMENTATION ONLY (OUTPATIENT)
Dept: SURGERY | Age: 65
End: 2020-09-25

## 2020-10-16 RX ORDER — OMEPRAZOLE 20 MG/1
CAPSULE, DELAYED RELEASE ORAL
Qty: 90 CAP | Refills: 0 | Status: SHIPPED | OUTPATIENT
Start: 2020-10-16 | End: 2021-03-29

## 2021-03-29 RX ORDER — OMEPRAZOLE 20 MG/1
CAPSULE, DELAYED RELEASE ORAL
Qty: 90 CAP | Refills: 0 | Status: SHIPPED | OUTPATIENT
Start: 2021-03-29 | End: 2021-06-28

## 2021-06-28 RX ORDER — OMEPRAZOLE 20 MG/1
CAPSULE, DELAYED RELEASE ORAL
Qty: 90 CAPSULE | Refills: 0 | Status: SHIPPED | OUTPATIENT
Start: 2021-06-28

## 2021-08-03 PROBLEM — E66.01 OBESITY, MORBID (HCC): Status: RESOLVED | Noted: 2018-07-26 | Resolved: 2021-08-03

## 2021-09-22 ENCOUNTER — DOCUMENTATION ONLY (OUTPATIENT)
Dept: SURGERY | Age: 66
End: 2021-09-22

## 2021-09-22 NOTE — PROGRESS NOTES
Per [facility] requirements;  E-mail and letter sent for follow up appointment. [facility]  [address] [facility], [address] [facility] [place]  [facility]  [facility]      Dear Patient,    Your health is our main concern. It is important for your health to have follow-up lab work and to see your surgeon at 2 months, 4 months, 6 months, 9 months and annually after your weight loss surgery. Additionally, the Department of Bariatric Surgery at our hospital is a member of the Metabolic and Bariatric Surgery Accreditation and Quality Improvement Program [facility]). As a participant in this program, we gather information on the outcomes of our patients after surgery. Please call the office for a follow up appointment at [phone]. If you have moved out of the area or have changed surgeons please call us and let us know the name of your doctor. Your health and feedback are important to us. We greatly appreciate your response.        Thank you,  [facility] [place] [facility] [address] [facility]

## 2023-09-25 NOTE — PROGRESS NOTES
Medical Weight Loss Multi-Disciplinary Program    Name: Chayito Guzmán   : 1955    Session# 4  Date: 2019     Height: 5' 4\" (162.6 cm)    Weight: 125.2 kg (276 lb) lbs. Body mass index is 47.38 kg/m². Pounds Lost:  lbs Pounds Gained: 4 lbs    Net weight loss 4 lbs  Dietary Instructions    Reviewed intake  Understanding low carbohydrates, low sugar, higher protein meals  Understanding proper portions  Dining outside home  Instruction given for personal dietary changes  Discussed perceived compliance  Comments: After reviewing patient's food records and identifying continued areas for improvement. Introduced the 3 gram rule for fat and sugar and patient demonstrated the ability to read nutrition labels. Next introduced key diet principles following weight loss surgery and helped identify areas to continue making progress, reviewed diet progression guide for post operative diet. Reinforced importance of adequate fluid and protein. Provided list of alternate protein choices    Typical intake is as follows:  Breakfast: Boost supplement  Lunch: Celery, carrots, cucumbers, with feta cheese OR ranch  Snack: Boost supplement   Dinner: Flounder with green beans and cucumbers   Snack: Snack: Boost supplements  Fluids: 80 oz water, almond milk,    Physical Activity/Exercise    Discussed Perceived Compliance  Reasonable Goals Set  Comments: Patient has been swimming 4-5 days per week for 30 minutes.      Behavior Modification    Identify obstacles to trigger change  Achieving/Rewarding goals met  Positive attitude  Comments: none    Candidate for surgery (per RD): YES     Dietitian: Aundrea Wood Glycopyrrolate Pregnancy And Lactation Text: This medication is Pregnancy Category B and is considered safe during pregnancy. It is unknown if it is excreted breast milk.

## (undated) DEVICE — TROCAR ENDOSCP BLDELSS 12X100 MM W/ HNDL STBL SL OPT TIP

## (undated) DEVICE — AGENT HEMSTAT W4XL4IN OXIDIZED REGENERATED CELOS STRUCTURED

## (undated) DEVICE — CLIP SUT ENDOSCP F/2-0/3-0/4-0 -- LAPRA-TY

## (undated) DEVICE — SUT ETHLN 3-0 18IN PS2 BLK --

## (undated) DEVICE — MAJ-1414 SINGLE USE ADPATER BIOPSY VALV: Brand: SINGLE USE ADAPTOR BIOPSY VALVE

## (undated) DEVICE — [HIGH FLOW INSUFFLATOR,  DO NOT USE IF PACKAGE IS DAMAGED,  KEEP DRY,  KEEP AWAY FROM SUNLIGHT,  PROTECT FROM HEAT AND RADIOACTIVE SOURCES.]: Brand: PNEUMOSURE

## (undated) DEVICE — DRAIN SURG 15FR L3/16IN SIL RND 3/4 FLUT 3/16IN TRCR

## (undated) DEVICE — FORCEPS BX OVL CUP SERR DISP CAP L 240CM RAD JAW 4

## (undated) DEVICE — TROCAR ENDOSCP L100MM DIA15MM BLDELSS STBL SL ENDOPATH XCEL

## (undated) DEVICE — STAPLER SKIN L440MM 32MM LNG 12 FIRING B FRM PWR + GRIPPING

## (undated) DEVICE — CUTTER ENDOSCP L340MM LIN ARTC SGL STROKE FIRING ENDOPATH

## (undated) DEVICE — TROCAR ENDOSCP L100MM DIA12MM STBL SL BLDELSS ENDOPATH XCEL

## (undated) DEVICE — SOLUTION LACTATED RINGERS INJECTION USP

## (undated) DEVICE — MEDI-VAC NON-CONDUCTIVE SUCTION TUBING: Brand: CARDINAL HEALTH

## (undated) DEVICE — RELOAD STPL L60MM H1-2.6MM MESENTERY THN TISS WHT 6 ROW

## (undated) DEVICE — TROCAR LAP L100MM DIA5MM BLDELSS W/ STBL SL ENDOPATH XCEL

## (undated) DEVICE — SUTURE PDS II SZ 0 L27IN ABSRB VLT L26MM CT-2 1/2 CIR Z334H

## (undated) DEVICE — NEEDLE INSUF L150MM DIA2MM DISP FOR PNEUMOPERI ENDOPATH

## (undated) DEVICE — SEALANT HEMSTAT 5ML HUM FIBRIN THROM 2 VI APPL DEV EVICEL

## (undated) DEVICE — STERILE POLYISOPRENE POWDER-FREE SURGICAL GLOVES: Brand: PROTEXIS

## (undated) DEVICE — SOL IRRIGATION INJ NACL 0.9% 500ML BTL

## (undated) DEVICE — RELOAD STPL H1-2.5X45MM VASC THN TISS WHT 6 ROW B FRM SGL

## (undated) DEVICE — TISSUE RETRIEVAL SYSTEM: Brand: INZII RETRIEVAL SYSTEM

## (undated) DEVICE — SYR 3ML LL TIP 1/10ML GRAD --

## (undated) DEVICE — SUTURE ETHLN SZ 3-0 L30IN NONABSORBABLE BLK FSL L30MM 3/8 1671H

## (undated) DEVICE — STRAP,POSITIONING,KNEE/BODY,FOAM,4X60": Brand: MEDLINE

## (undated) DEVICE — ENDOCUT SCISSOR TIP, DISPOSABLE: Brand: RENEW

## (undated) DEVICE — ENDO CARRY-ON PROCEDURE KIT INCLUDES ENZYMATIC SPONGE, GAUZE, BIOHAZARD LABEL, TRAY, LUBRICANT, DIRTY SCOPE LABEL, WATER LABEL, TRAY, DRAWSTRING PAD, AND DEFENDO 4-PIECE KIT.: Brand: ENDO CARRY-ON PROCEDURE KIT

## (undated) DEVICE — BARIATRIC: Brand: MEDLINE INDUSTRIES, INC.

## (undated) DEVICE — SUTURE ETHIB EXCL BR GRN TAPR PT 2-0 30 X563H X563H

## (undated) DEVICE — AGENT HEMSTAT W6XL9IN OXIDIZED REGENERATED CELOS ABSRB FOR

## (undated) DEVICE — SHEAR HARMONIC 5MMX45CM -- ACE 7+

## (undated) DEVICE — GARMENT,MEDLINE,DVT,INT,CALF,MED, GEN2: Brand: MEDLINE

## (undated) DEVICE — DISPOSABLE SUCTION/IRRIGATOR TUBE SET WITH TIP: Brand: AHTO

## (undated) DEVICE — TIP APPL L35CM RIG FOR SEAL EVICEL

## (undated) DEVICE — SLEEVE TRCR L100MM DIA5MM UNIV STBL FOR BLDELSS DIL TIP

## (undated) DEVICE — STAPLER INT L37CM STPL 21MM CIR ENDOSCP CRV INTLUMN B FRM

## (undated) DEVICE — APPLIER CLP L SHFT DIA12MM 20 ROT MULT LIGACLP

## (undated) DEVICE — PREP SKN PREVAIL 40ML APPL --

## (undated) DEVICE — TRAY CATH OD16FR SIL URIN M STATLOK STBL DEV SURSTP

## (undated) DEVICE — KENDALL SCD EXPRESS SLEEVES, KNEE LENGTH, MEDIUM: Brand: KENDALL SCD

## (undated) DEVICE — VISUALIZATION SYSTEM: Brand: CLEARIFY

## (undated) DEVICE — SUT PROL 2-0 30IN CT2 BLU --

## (undated) DEVICE — GOWN ISOLATN REG BLU POLY UNISX W/ THMB LOOP

## (undated) DEVICE — 4-PORT MANIFOLD: Brand: NEPTUNE 2

## (undated) DEVICE — RELOAD STPL L60MM H1.5-3.6MM REG TISS BLU GRIPPING SURF B

## (undated) DEVICE — CATHETER JEJUSTMY AD 16FR 15CC L108IN THMB VLV FOR DCOMPR

## (undated) DEVICE — SUT MONOCRYL PLUS UD 4-0 --